# Patient Record
Sex: FEMALE | Race: BLACK OR AFRICAN AMERICAN | NOT HISPANIC OR LATINO | Employment: PART TIME | URBAN - METROPOLITAN AREA
[De-identification: names, ages, dates, MRNs, and addresses within clinical notes are randomized per-mention and may not be internally consistent; named-entity substitution may affect disease eponyms.]

---

## 2018-10-07 ENCOUNTER — HOSPITAL ENCOUNTER (EMERGENCY)
Facility: OTHER | Age: 38
Discharge: HOME | End: 2018-10-07
Attending: EMERGENCY MEDICINE
Payer: MEDICAID

## 2018-10-07 VITALS
TEMPERATURE: 99 F | HEART RATE: 89 BPM | SYSTOLIC BLOOD PRESSURE: 108 MMHG | RESPIRATION RATE: 18 BRPM | WEIGHT: 135 LBS | OXYGEN SATURATION: 100 % | BODY MASS INDEX: 26.5 KG/M2 | DIASTOLIC BLOOD PRESSURE: 68 MMHG | HEIGHT: 60 IN

## 2018-10-07 DIAGNOSIS — R55 SYNCOPE: ICD-10-CM

## 2018-10-07 DIAGNOSIS — Z3A.09 9 WEEKS GESTATION OF PREGNANCY: Primary | ICD-10-CM

## 2018-10-07 LAB
ABO + RH BLD: NORMAL
ANION GAP SERPL CALC-SCNC: 11 MMOL/L
B-HCG UR QL: POSITIVE
BASOPHILS # BLD AUTO: 0.02 K/UL
BASOPHILS NFR BLD: 0.2 %
BILIRUB UR QL STRIP: NEGATIVE
BUN SERPL-MCNC: 11 MG/DL
CALCIUM SERPL-MCNC: 9.6 MG/DL
CHLORIDE SERPL-SCNC: 105 MMOL/L
CLARITY UR: CLEAR
CO2 SERPL-SCNC: 22 MMOL/L
COLOR UR: YELLOW
CREAT SERPL-MCNC: 0.7 MG/DL
CTP QC/QA: YES
DIFFERENTIAL METHOD: ABNORMAL
EOSINOPHIL # BLD AUTO: 0.2 K/UL
EOSINOPHIL NFR BLD: 1.7 %
ERYTHROCYTE [DISTWIDTH] IN BLOOD BY AUTOMATED COUNT: 14.4 %
EST. GFR  (AFRICAN AMERICAN): >60 ML/MIN/1.73 M^2
EST. GFR  (NON AFRICAN AMERICAN): >60 ML/MIN/1.73 M^2
GLUCOSE SERPL-MCNC: 88 MG/DL
GLUCOSE UR QL STRIP: NEGATIVE
HCG INTACT+B SERPL-ACNC: NORMAL MIU/ML
HCT VFR BLD AUTO: 34.1 %
HGB BLD-MCNC: 11.1 G/DL
HGB UR QL STRIP: ABNORMAL
KETONES UR QL STRIP: ABNORMAL
LEUKOCYTE ESTERASE UR QL STRIP: NEGATIVE
LYMPHOCYTES # BLD AUTO: 2.4 K/UL
LYMPHOCYTES NFR BLD: 23.2 %
MCH RBC QN AUTO: 27.1 PG
MCHC RBC AUTO-ENTMCNC: 32.6 G/DL
MCV RBC AUTO: 83 FL
MICROSCOPIC COMMENT: NORMAL
MONOCYTES # BLD AUTO: 0.7 K/UL
MONOCYTES NFR BLD: 6.8 %
NEUTROPHILS # BLD AUTO: 7 K/UL
NEUTROPHILS NFR BLD: 67.9 %
NITRITE UR QL STRIP: NEGATIVE
PH UR STRIP: 6 [PH] (ref 5–8)
PLATELET # BLD AUTO: 277 K/UL
PMV BLD AUTO: 10.7 FL
POTASSIUM SERPL-SCNC: 3.6 MMOL/L
PROT UR QL STRIP: NEGATIVE
RBC # BLD AUTO: 4.1 M/UL
RBC #/AREA URNS HPF: 4 /HPF (ref 0–4)
RH BLD: NORMAL
SODIUM SERPL-SCNC: 138 MMOL/L
SP GR UR STRIP: >=1.03 (ref 1–1.03)
SQUAMOUS #/AREA URNS HPF: 2 /HPF
URN SPEC COLLECT METH UR: ABNORMAL
UROBILINOGEN UR STRIP-ACNC: 1 EU/DL
WBC # BLD AUTO: 10.3 K/UL

## 2018-10-07 PROCEDURE — 93010 ELECTROCARDIOGRAM REPORT: CPT | Mod: ,,, | Performed by: INTERNAL MEDICINE

## 2018-10-07 PROCEDURE — 85025 COMPLETE CBC W/AUTO DIFF WBC: CPT

## 2018-10-07 PROCEDURE — 86901 BLOOD TYPING SEROLOGIC RH(D): CPT

## 2018-10-07 PROCEDURE — 81025 URINE PREGNANCY TEST: CPT | Performed by: EMERGENCY MEDICINE

## 2018-10-07 PROCEDURE — 84702 CHORIONIC GONADOTROPIN TEST: CPT

## 2018-10-07 PROCEDURE — 80048 BASIC METABOLIC PNL TOTAL CA: CPT

## 2018-10-07 PROCEDURE — 99285 EMERGENCY DEPT VISIT HI MDM: CPT | Mod: 25

## 2018-10-07 PROCEDURE — 81000 URINALYSIS NONAUTO W/SCOPE: CPT

## 2018-10-07 PROCEDURE — 86901 BLOOD TYPING SEROLOGIC RH(D): CPT | Mod: 91

## 2018-10-07 PROCEDURE — 93005 ELECTROCARDIOGRAM TRACING: CPT

## 2018-10-07 NOTE — ED NOTES
Appearance: Pt awake, alert & oriented to person, place & time. Pt in no acute distress at present time. Pt is clean and well groomed with clothes appropriately fastened.   Skin: Skin warm, dry & intact. Color consistent with ethnicity. Mucous membranes moist. No breakdown or brusing noted.   Musculoskeletal: Patient moving all extremities well, no obvious swelling or deformities noted.   Respiratory: Respirations spontaneous, even, and non-labored. Visible chest rise noted. Airway is open and patent. No accessory muscle use noted.   Neurologic: Sensation is intact. Speech is clear and appropriate. Eyes open spontaneously, behavior appropriate to situation, follows commands, facial expression symmetrical, bilateral hand grasp equal and even, purposeful motor response noted.  Cardiac:No Bilateral lower extremity edema. Cap refill is <3 seconds. Pt denies active chest pains, SOB, dizziness, blurred vision, weakness or fatigue at this time.   Abdomen: Pt denies active abd pains, cramping or discomfort, No N/V/D at this time.

## 2018-10-07 NOTE — ED NOTES
"Pt to ED, reporting episodes of dizziness, weakness, with + LOC while at home yesterday. Pt states "I passed out but I think only for 10 minutes." Pt denies vomiting, nausea, or ABD pain. Pt AAOx4 and appropriate at this time. Respirations even and unlabored. No acute distress noted.    "

## 2018-10-07 NOTE — ED PROVIDER NOTES
"Encounter Date: 10/7/2018    SCRIBE #1 NOTE: I, Adilenelevi Major, am scribing for, and in the presence of, Dr. Cruz.       History     Chief Complaint   Patient presents with    Loss of Consciousness     Pt reports episodes of "passing out". Reports been going on for 1.5 yrs. Last episode yesterday     Time seen by provider: 2:38 PM    This is a 37 y.o. Female, , who presents with complaint of syncope yesterday. She reports that she has been having episodes approximately once a week for the last year. She was prompted to be seen at the ED today due to being "tired of the feeling of passing out". She reports that her most recent episode occurred while she was washing dishes. She was able to fall onto the sofa, and she experienced blurry vision, but no complete LOC. However, she reports that some of her episodes do involve complete LOC. She denies fever, chills, rhinorrhea, photophobia, SOB, CP, abdominal pain, N/V/D, any urinary symptoms, back pain, headaches, or weakness. She has no PCP. She reports that she has been under a lot of stress lately. She denies history of heart problems. The pt states that when she last went to the pharmacy to retrieve her birth control a month ago, she noticed that the birth control had been changed.        The history is provided by the patient.     Review of patient's allergies indicates:  No Known Allergies  History reviewed. No pertinent past medical history.  Past Surgical History:   Procedure Laterality Date     SECTION       History reviewed. No pertinent family history.  Social History     Tobacco Use    Smoking status: Never Smoker    Smokeless tobacco: Never Used   Substance Use Topics    Alcohol use: No     Frequency: Never    Drug use: Yes     Types: Marijuana     Review of Systems   Constitutional: Negative for chills and fever.   HENT: Negative for rhinorrhea.    Eyes: Positive for visual disturbance. Negative for photophobia.   Respiratory: Negative " for shortness of breath.    Cardiovascular: Negative for chest pain.   Gastrointestinal: Negative for abdominal pain, diarrhea, nausea and vomiting.   Genitourinary: Negative for decreased urine volume, difficulty urinating, dyspareunia, dysuria, enuresis, frequency, hematuria and urgency.   Musculoskeletal: Negative for back pain.   Skin: Negative for color change, pallor and rash.   Neurological: Positive for syncope. Negative for weakness and headaches.   Hematological: Negative for adenopathy. Does not bruise/bleed easily.       Physical Exam     Initial Vitals [10/07/18 1434]   BP Pulse Resp Temp SpO2   115/66 75 18 98.4 °F (36.9 °C) 99 %      MAP       --         Physical Exam    Nursing note and vitals reviewed.  Constitutional: She appears well-developed and well-nourished. She is not diaphoretic. No distress.   Tearful when told she was pregnant.   HENT:   Head: Normocephalic and atraumatic.   Eyes: Conjunctivae and EOM are normal. No scleral icterus.   Neck: Normal range of motion. Neck supple.   Cardiovascular: Normal rate, regular rhythm and normal heart sounds. Exam reveals no gallop and no friction rub.    No murmur heard.  Pulmonary/Chest: Breath sounds normal. No respiratory distress. She has no wheezes. She has no rhonchi. She has no rales.   Abdominal: Soft. Bowel sounds are normal. She exhibits no distension. There is no tenderness. There is no rebound and no guarding.   Musculoskeletal: Normal range of motion. She exhibits no edema or tenderness.   Neurological: She is alert and oriented to person, place, and time.   Skin: Skin is warm and dry. No rash noted. No erythema. No pallor.   Psychiatric: She has a normal mood and affect. Her behavior is normal. Judgment and thought content normal.         ED Course   Procedures  Labs Reviewed   CBC W/ AUTO DIFFERENTIAL - Abnormal; Notable for the following components:       Result Value    Hemoglobin 11.1 (*)     Hematocrit 34.1 (*)     All other  components within normal limits   BASIC METABOLIC PANEL - Abnormal; Notable for the following components:    CO2 22 (*)     All other components within normal limits   URINALYSIS, REFLEX TO URINE CULTURE - Abnormal; Notable for the following components:    Specific Gravity, UA >=1.030 (*)     Ketones, UA Trace (*)     Occult Blood UA 2+ (*)     All other components within normal limits    Narrative:     Preferred Collection Type->Urine, Clean Catch   POCT URINE PREGNANCY - Abnormal; Notable for the following components:    POC Preg Test, Ur Positive (*)     All other components within normal limits   HCG, QUANTITATIVE, PREGNANCY   URINALYSIS MICROSCOPIC    Narrative:     Preferred Collection Type->Urine, Clean Catch   GROUP & RH   RH TYPING     EKG Readings: (Independently Interpreted)   Rhythm: Normal Sinus Rhythm. Heart Rate: 66. Ectopy: No Ectopy. Conduction: Normal. ST Segments: Normal ST Segments. T Waves: Normal. Clinical Impression: Normal Sinus Rhythm       Imaging Results          US OB Less Than 14 Wks with Transvaginal (xpd) (Final result)  Result time 10/07/18 17:16:40   Procedure changed from US OB Less Than 14 Wks Add Gestation     Final result by Bart Berg MD (10/07/18 17:16:40)                 Impression:      Single live intrauterine gestation corresponding to 8 weeks and 6 days with expected date of delivery of 05/13/2019.  Small associated subchorionic hemorrhage.  Continued follow-up is suggested.      Electronically signed by: Bart Berg MD  Date:    10/07/2018  Time:    17:16             Narrative:    EXAMINATION:  US OB LESS THAN 14 WKS WITH TRANSVAGINAL (XPD)    CLINICAL HISTORY:  syncope; Syncope and collapse    TECHNIQUE:  Transabdominal sonography of the pelvis was performed, followed by transvaginal sonography to better evaluate the uterus and ovaries.    COMPARISON:  None.    FINDINGS:  The uterus measures 9.8 x 6.2 x 6.4 cm.  There is a single live intrauterine gestation with  gestational sac diameter measuring 3.46 cm.  The crown-rump length measures 19.16 mm.  There is a yolk sac present.  There is a fetal pole with fetal heart rate of 172 beats per minute.  The average ultrasound age is 8 weeks and 6 days.  The expected date of delivery is 05/13/2019.  There is a small subchorionic hemorrhage.  There is a subcentimeter uterine fibroid measuring 0.8 x 0.8 x 0.8 cm.  There is trace amount of fluid within the endocervical canal.  The cervix is closed.    The right ovary measures 2.3 x 1.8 x 1.2 cm.  The left ovary measures 3.3 x 3.3 x 2.1 cm.  There is a left-sided corpus luteum cyst.  There is normal flow to both ovaries.    There is no evidence of free fluid.                                 Medical Decision Making:   Clinical Tests:   Lab Tests: Ordered and Reviewed  Radiological Study: Ordered and Reviewed  Medical Tests: Ordered and Reviewed  ED Management:  37-year-old female with near syncope.  States this has been occurring intermittently approximately 1 time per week for the last year.  She has no factors that she can note that preceded this.  She states she has been going through a bit of stress recently.  EKG is normal.  Pregnancy test is positive. The patient was unaware of this.  Last menstrual.  Was approximately September 12th about 4 weeks ago.  Will go ahead and get a pregnancy workup to rule out ectopic pregnancy as the cause of her syncopal episode yesterday.    The the blood work shows no acute abnormality.  HCG quantitative is 115,000.  Ultrasound shows 9 weeks intrauterine fetus.  Patient's blood type is A negative. She denies any vaginal bleeding.  RhoGAM is not indicated.  I discussed the results with the patient.  She recommend she follow up with her OB doctor for prenatal care tomorrow.    Patient discharged home in stable condition. Diagnosis and treatment plan explained to patient. I have answered all questions and the patient is satisfied with the plan of care.  The patient demonstrates understanding of the care plan. This is the extent to the patients complaints today here in the emergency department.            Scribe Attestation:   Scribe #1: I performed the above scribed service and the documentation accurately describes the services I performed. I attest to the accuracy of the note.    Attending Attestation:           Physician Attestation for Scribe:  Physician Attestation Statement for Scribe #1: I, Dr. Cruz, reviewed documentation, as scribed by Chance Major in my presence, and it is both accurate and complete.                    Clinical Impression:     1. 9 weeks gestation of pregnancy    2. Syncope                                   Lex Cruz, DO  10/07/18 1802

## 2019-02-28 PROBLEM — R07.9 CHEST PAIN: Status: ACTIVE | Noted: 2019-02-28

## 2019-02-28 PROBLEM — G93.41 ACUTE METABOLIC ENCEPHALOPATHY: Status: ACTIVE | Noted: 2019-02-28

## 2019-02-28 PROBLEM — J96.02 ACUTE RESPIRATORY FAILURE WITH HYPOXIA AND HYPERCAPNIA: Status: ACTIVE | Noted: 2019-02-28

## 2019-02-28 PROBLEM — T40.601A OPIATE OVERDOSE: Status: ACTIVE | Noted: 2019-02-28

## 2019-02-28 PROBLEM — J96.01 ACUTE RESPIRATORY FAILURE WITH HYPOXIA AND HYPERCAPNIA: Status: ACTIVE | Noted: 2019-02-28

## 2022-05-20 ENCOUNTER — OFFICE VISIT (OUTPATIENT)
Dept: PRIMARY CARE CLINIC | Facility: CLINIC | Age: 42
End: 2022-05-20
Payer: MEDICAID

## 2022-05-20 VITALS
HEIGHT: 60 IN | OXYGEN SATURATION: 97 % | DIASTOLIC BLOOD PRESSURE: 82 MMHG | WEIGHT: 145.63 LBS | SYSTOLIC BLOOD PRESSURE: 121 MMHG | HEART RATE: 73 BPM | TEMPERATURE: 98 F | BODY MASS INDEX: 28.59 KG/M2

## 2022-05-20 DIAGNOSIS — F41.1 GENERALIZED ANXIETY DISORDER: ICD-10-CM

## 2022-05-20 DIAGNOSIS — F33.1 MODERATE EPISODE OF RECURRENT MAJOR DEPRESSIVE DISORDER: Primary | ICD-10-CM

## 2022-05-20 PROBLEM — G93.41 ACUTE METABOLIC ENCEPHALOPATHY: Status: RESOLVED | Noted: 2019-02-28 | Resolved: 2022-05-20

## 2022-05-20 PROBLEM — J96.01 ACUTE RESPIRATORY FAILURE WITH HYPOXIA AND HYPERCAPNIA: Status: RESOLVED | Noted: 2019-02-28 | Resolved: 2022-05-20

## 2022-05-20 PROBLEM — J96.02 ACUTE RESPIRATORY FAILURE WITH HYPOXIA AND HYPERCAPNIA: Status: RESOLVED | Noted: 2019-02-28 | Resolved: 2022-05-20

## 2022-05-20 PROBLEM — R07.9 CHEST PAIN: Status: RESOLVED | Noted: 2019-02-28 | Resolved: 2022-05-20

## 2022-05-20 PROBLEM — T40.601A OPIATE OVERDOSE: Status: RESOLVED | Noted: 2019-02-28 | Resolved: 2022-05-20

## 2022-05-20 PROCEDURE — 99214 OFFICE O/P EST MOD 30 MIN: CPT | Mod: S$PBB,,, | Performed by: FAMILY MEDICINE

## 2022-05-20 PROCEDURE — 3008F BODY MASS INDEX DOCD: CPT | Mod: CPTII,,, | Performed by: FAMILY MEDICINE

## 2022-05-20 PROCEDURE — 99214 OFFICE O/P EST MOD 30 MIN: CPT | Mod: PBBFAC,PN | Performed by: FAMILY MEDICINE

## 2022-05-20 PROCEDURE — 99999 PR PBB SHADOW E&M-EST. PATIENT-LVL IV: ICD-10-PCS | Mod: PBBFAC,,, | Performed by: FAMILY MEDICINE

## 2022-05-20 PROCEDURE — 1159F PR MEDICATION LIST DOCUMENTED IN MEDICAL RECORD: ICD-10-PCS | Mod: CPTII,,, | Performed by: FAMILY MEDICINE

## 2022-05-20 PROCEDURE — 1159F MED LIST DOCD IN RCRD: CPT | Mod: CPTII,,, | Performed by: FAMILY MEDICINE

## 2022-05-20 PROCEDURE — 3074F PR MOST RECENT SYSTOLIC BLOOD PRESSURE < 130 MM HG: ICD-10-PCS | Mod: CPTII,,, | Performed by: FAMILY MEDICINE

## 2022-05-20 PROCEDURE — 3074F SYST BP LT 130 MM HG: CPT | Mod: CPTII,,, | Performed by: FAMILY MEDICINE

## 2022-05-20 PROCEDURE — 3079F PR MOST RECENT DIASTOLIC BLOOD PRESSURE 80-89 MM HG: ICD-10-PCS | Mod: CPTII,,, | Performed by: FAMILY MEDICINE

## 2022-05-20 PROCEDURE — 99999 PR PBB SHADOW E&M-EST. PATIENT-LVL IV: CPT | Mod: PBBFAC,,, | Performed by: FAMILY MEDICINE

## 2022-05-20 PROCEDURE — 99214 PR OFFICE/OUTPT VISIT, EST, LEVL IV, 30-39 MIN: ICD-10-PCS | Mod: S$PBB,,, | Performed by: FAMILY MEDICINE

## 2022-05-20 PROCEDURE — 3079F DIAST BP 80-89 MM HG: CPT | Mod: CPTII,,, | Performed by: FAMILY MEDICINE

## 2022-05-20 PROCEDURE — 3008F PR BODY MASS INDEX (BMI) DOCUMENTED: ICD-10-PCS | Mod: CPTII,,, | Performed by: FAMILY MEDICINE

## 2022-05-20 RX ORDER — FLUOXETINE 10 MG/1
10 CAPSULE ORAL DAILY
Qty: 30 CAPSULE | Refills: 1 | Status: SHIPPED | OUTPATIENT
Start: 2022-05-20 | End: 2023-05-20

## 2022-05-20 NOTE — PROGRESS NOTES
Subjective:       Patient ID: Jolie Erwin is a 41 y.o. female.    Chief Complaint: Establish Care (ER discharge f/u)    40 yo F pt, new to me, with PMH significant for childhood asthma. She presents to [facility]; acutely here for ED discharge follow-up visit from 2022.  She presented with complaint of SOB one day prior to admission.  Symptoms began while she was cooking.  Developed associated jitteriness, hot flashes, heart palpitations, and chest tightness.  No cough or wheezing.  Reported anxious feelings at times.  She had an x-ray which was negative.  Symptoms and deemed to be secondary to anxiety/panic.  She was discharged with clonazepam 0.5 mg TID and advised to follow-up with a PCP.     Since discharge she has been taking 1/2 tab of clonazepam 0.5 mg BID. This is somewhat helping with feelings of anxiety, however, she continues to feel down and depressed. Reports she was rx'd lexapro in  for depression, however, she stopped taking the medication because they made her feel like a zombie.  She was also prescribed Xanax in the past.  Has never done any psychotherapy.  Patient's abnormal mood stems from difficulties dealing with her son.  Patient states that her son has been depressed; she is frustrated and helpless as she can't help son. No SI/HI/AVH.      Past Medical History:   Diagnosis Date    Childhood asthma        Patient Active Problem List   Diagnosis    Fracture of left ulna    Moderate episode of recurrent major depressive disorder    Generalized anxiety disorder       Past Surgical History:   Procedure Laterality Date     SECTION          ENDOMETRIAL ABLATION  2017       Family History   Problem Relation Age of Onset    Stroke Maternal Grandmother     Hypertension Maternal Cousin     Hypertension Maternal Cousin        Social History     Tobacco Use   Smoking Status Never Smoker   Smokeless Tobacco Never Used       Social History     Social History Narrative     Tobacco: Intermittent use since 20s; max use 1 pack per week. Max time of no use 2-3 years.     EtOH: 1-2 drinks social gatherings 9-10x per year    Drug: recreational use; marijuana at bedtime 3-4x per week    Employment: Unemployed    Education: HS     Lives with 2 children (18 yo and 21 yo) and 2yo grandson     2017       Medications have been reviewed and reconciled.     Review of patient's allergies indicates:  No Known Allergies     Review of Systems   Psychiatric/Behavioral: Positive for dysphoric mood. The patient is nervous/anxious.          Objective:        /82 (BP Location: Right arm, Patient Position: Sitting, BP Method: Medium (Automatic))   Pulse 73   Temp 98.4 °F (36.9 °C) (Oral)   Ht 5' (1.524 m)   Wt 66 kg (145 lb 9.8 oz)   SpO2 97%   BMI 28.44 kg/m²      Physical Exam  Constitutional:       General: She is not in acute distress.     Appearance: She is well-developed.   HENT:      Head: Normocephalic and atraumatic.      Right Ear: External ear normal.      Left Ear: External ear normal.   Eyes:      General: No scleral icterus.     Extraocular Movements: Extraocular movements intact.      Conjunctiva/sclera: Conjunctivae normal.   Cardiovascular:      Rate and Rhythm: Normal rate and regular rhythm.      Heart sounds: Normal heart sounds. No murmur heard.    No friction rub. No gallop.   Pulmonary:      Effort: Pulmonary effort is normal.      Breath sounds: Normal breath sounds. No wheezing or rales.   Musculoskeletal:         General: Normal range of motion.      Cervical back: Normal range of motion and neck supple.   Skin:     General: Skin is warm and dry.      Findings: No erythema or rash.   Neurological:      Mental Status: She is alert and oriented to person, place, and time.      Cranial Nerves: No cranial nerve deficit.   Psychiatric:         Mood and Affect: Mood is depressed. Affect is tearful.         Behavior: Behavior normal.         Assessment:       1.  Moderate episode of recurrent major depressive disorder    2. Generalized anxiety disorder        Plan:       Jolie was seen today for establish care.    Diagnoses and all orders for this visit:    Moderate episode of recurrent major depressive disorder  -     Ambulatory referral/consult to Primary Care Behavioral Health (Non-Opioids); Future  -     FLUoxetine 10 MG capsule; Take 1 capsule (10 mg total) by mouth once daily.    Generalized anxiety disorder      Moderate MDD, Moderate MAK   PHQ 9 score is 15; MAK-7 score is 14 in office today 05/20/2022  Continue clonazepam 0.25 mg twice daily p.r.n..  Start fluoxetine 10 mg daily.  Referred to primary care behavioral health program  Will see patient in approximately 3 weeks for medication titration as tolerated.  Pt's son is welcome to see me via virtual visit.       Follow up in about 3 weeks (around 6/10/2022) for mood (in office or virtual). Can be same day slot.  Will need bedrest preventive health measures over longitudinal visits.    I spent a total of 30 minutes on the day of the visit.This includes face to face time and non-face to face time preparing to see the patient (eg, review of tests), obtaining and/or reviewing separately obtained history, documenting clinical information in the electronic or other health record, independently interpreting results and communicating results to the patient/family/caregiver, or care coordinator.

## 2022-05-23 ENCOUNTER — TELEPHONE (OUTPATIENT)
Dept: BEHAVIORAL HEALTH | Facility: CLINIC | Age: 42
End: 2022-05-23
Payer: MEDICAID

## 2022-05-23 NOTE — PROGRESS NOTES
CHW made an attempt to contact patient regarding the referral.  CHW was unable to leave a message.

## 2022-05-24 ENCOUNTER — TELEPHONE (OUTPATIENT)
Dept: BEHAVIORAL HEALTH | Facility: CLINIC | Age: 42
End: 2022-05-24
Payer: MEDICAID

## 2022-05-24 NOTE — PROGRESS NOTES
CHW made a second attempt to contact patient concerning the referral.  CHW was unable to leave a voice mail message.

## 2022-05-27 ENCOUNTER — TELEPHONE (OUTPATIENT)
Dept: BEHAVIORAL HEALTH | Facility: CLINIC | Age: 42
End: 2022-05-27
Payer: MEDICAID

## 2022-05-27 NOTE — PROGRESS NOTES
CHW contacted patient regarding the referral.  CHW left a message asking patient to return her telephone call.

## 2022-06-01 DIAGNOSIS — Z12.31 OTHER SCREENING MAMMOGRAM: ICD-10-CM

## 2022-06-07 ENCOUNTER — PATIENT MESSAGE (OUTPATIENT)
Dept: PRIMARY CARE CLINIC | Facility: CLINIC | Age: 42
End: 2022-06-07
Payer: MEDICAID

## 2022-06-13 ENCOUNTER — TELEPHONE (OUTPATIENT)
Dept: BEHAVIORAL HEALTH | Facility: CLINIC | Age: 42
End: 2022-06-13
Payer: MEDICAID

## 2022-06-13 NOTE — PROGRESS NOTES
CHW made a third attempt to contact patient regarding the referral.  CHW was unable to leave a voicemail message.

## 2022-06-15 ENCOUNTER — TELEPHONE (OUTPATIENT)
Dept: BEHAVIORAL HEALTH | Facility: CLINIC | Age: 42
End: 2022-06-15
Payer: MEDICAID

## 2022-06-15 ENCOUNTER — PATIENT MESSAGE (OUTPATIENT)
Dept: BEHAVIORAL HEALTH | Facility: CLINIC | Age: 42
End: 2022-06-15
Payer: MEDICAID

## 2022-06-15 NOTE — PROGRESS NOTES
CHW made a fourth attempt to contact patient regarding the referral.  Patient did not answer her telephone.  CHW will send patient a email message to inform her of the referral.

## 2022-07-13 DIAGNOSIS — Z11.59 NEED FOR HEPATITIS C SCREENING TEST: ICD-10-CM

## 2022-07-21 ENCOUNTER — PATIENT OUTREACH (OUTPATIENT)
Dept: ADMINISTRATIVE | Facility: OTHER | Age: 42
End: 2022-07-21
Payer: MEDICAID

## 2022-07-21 ENCOUNTER — OFFICE VISIT (OUTPATIENT)
Dept: PRIMARY CARE CLINIC | Facility: CLINIC | Age: 42
End: 2022-07-21
Payer: MEDICAID

## 2022-07-21 VITALS
WEIGHT: 144.31 LBS | OXYGEN SATURATION: 97 % | HEART RATE: 64 BPM | BODY MASS INDEX: 28.33 KG/M2 | HEIGHT: 60 IN | DIASTOLIC BLOOD PRESSURE: 80 MMHG | SYSTOLIC BLOOD PRESSURE: 114 MMHG

## 2022-07-21 DIAGNOSIS — F33.1 MODERATE EPISODE OF RECURRENT MAJOR DEPRESSIVE DISORDER: Primary | ICD-10-CM

## 2022-07-21 DIAGNOSIS — F41.1 GENERALIZED ANXIETY DISORDER: ICD-10-CM

## 2022-07-21 PROCEDURE — 99999 PR PBB SHADOW E&M-EST. PATIENT-LVL III: ICD-10-PCS | Mod: PBBFAC,,, | Performed by: FAMILY MEDICINE

## 2022-07-21 PROCEDURE — 3008F PR BODY MASS INDEX (BMI) DOCUMENTED: ICD-10-PCS | Mod: CPTII,,, | Performed by: FAMILY MEDICINE

## 2022-07-21 PROCEDURE — 1159F MED LIST DOCD IN RCRD: CPT | Mod: CPTII,,, | Performed by: FAMILY MEDICINE

## 2022-07-21 PROCEDURE — 99213 OFFICE O/P EST LOW 20 MIN: CPT | Mod: PBBFAC,PN | Performed by: FAMILY MEDICINE

## 2022-07-21 PROCEDURE — 3074F SYST BP LT 130 MM HG: CPT | Mod: CPTII,,, | Performed by: FAMILY MEDICINE

## 2022-07-21 PROCEDURE — 3079F PR MOST RECENT DIASTOLIC BLOOD PRESSURE 80-89 MM HG: ICD-10-PCS | Mod: CPTII,,, | Performed by: FAMILY MEDICINE

## 2022-07-21 PROCEDURE — 3074F PR MOST RECENT SYSTOLIC BLOOD PRESSURE < 130 MM HG: ICD-10-PCS | Mod: CPTII,,, | Performed by: FAMILY MEDICINE

## 2022-07-21 PROCEDURE — 99213 PR OFFICE/OUTPT VISIT, EST, LEVL III, 20-29 MIN: ICD-10-PCS | Mod: S$PBB,,, | Performed by: FAMILY MEDICINE

## 2022-07-21 PROCEDURE — 99999 PR PBB SHADOW E&M-EST. PATIENT-LVL III: CPT | Mod: PBBFAC,,, | Performed by: FAMILY MEDICINE

## 2022-07-21 PROCEDURE — 3079F DIAST BP 80-89 MM HG: CPT | Mod: CPTII,,, | Performed by: FAMILY MEDICINE

## 2022-07-21 PROCEDURE — 3008F BODY MASS INDEX DOCD: CPT | Mod: CPTII,,, | Performed by: FAMILY MEDICINE

## 2022-07-21 PROCEDURE — 1159F PR MEDICATION LIST DOCUMENTED IN MEDICAL RECORD: ICD-10-PCS | Mod: CPTII,,, | Performed by: FAMILY MEDICINE

## 2022-07-21 PROCEDURE — 99213 OFFICE O/P EST LOW 20 MIN: CPT | Mod: S$PBB,,, | Performed by: FAMILY MEDICINE

## 2022-07-21 NOTE — PROGRESS NOTES
CHW - Initial Contact    This Community Health Worker completed the Social Determinant of Health questionnaire with [id number] in person on today.    Pt identified barriers of most importance are: Please see SDOH icons   Referrals to community agencies completed with patient/caregiver consent outside of [facility] include: NO outside referrals at this time.   Referrals were put through [facility] - No referrals   Support and Services: N/A  Other information discussed the patient needs / wants help with: The pt shared that she does not need any resources at this time.   Follow up required: Yes   Follow-up Outreach - Due: 7/27/2022

## 2022-07-21 NOTE — PROGRESS NOTES
"Subjective:       Patient ID: Jolie Erwin is a 41 y.o. female.    Chief Complaint: Follow-up    40 yo F, established pt of mine, with PMH significant for childhood asthma and newly dx'd MDD/MAK. She presents for a f/u visit. She was last evaluated 05/20/2022.  Details of previous visit(s) reviewed:   ======  She presented to ED on 05/06/2022 with complaint of SOB one day prior to admission.  Symptoms began while she was cooking.  Developed associated jitteriness, hot flashes, heart palpitations, and chest tightness.  No cough or wheezing.  Reported anxious feelings at times.  She had an x-ray which was negative.  Symptoms and deemed to be secondary to anxiety/panic.  She was discharged with clonazepam 0.5 mg TID and advised to follow-up with a PCP.     Post discharge she has been taking 1/2 tab of clonazepam 0.5 mg BID. This somewhat helps with feelings of anxiety, however, she continues to feel down and depressed. Reports she was rx'd lexapro in 2005 for depression, however, she stopped taking the medication because they made her "feel like a zombie".  She was also prescribed Xanax in the past.  Has never done any psychotherapy.  Patient's abnormal mood stems from difficulties dealing with her son.  Patient states that her son has been depressed; she is frustrated and helpless as she can't help son. No SI/HI/AVH.  ==========  Last visit, she was started on clonazepam 0.25 mg twice daily p.r.n. +  fluoxetine 10 mg daily. Reports she was becoming too sleepy with clonazepam. On average was taking fluoxetine 10 mg every other day; medication caused insomnia. She never established care with primary care behavioral health. She did not establish with an outside therapist. 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  She has been generally doing well with control of her emotions via deep breathing. She would like to hold off on restarting " medication; she would like to give therapy a try.      Past Medical History:   Diagnosis Date    Childhood asthma        Patient Active Problem List   Diagnosis    Fracture of left ulna    Moderate episode of recurrent major depressive disorder    Generalized anxiety disorder       Past Surgical History:   Procedure Laterality Date     SECTION          ENDOMETRIAL ABLATION  2017       Family History   Problem Relation Age of Onset    Stroke Maternal Grandmother     Hypertension Maternal Cousin     Hypertension Maternal Cousin        Social History     Tobacco Use   Smoking Status Never Smoker   Smokeless Tobacco Never Used       Social History     Social History Narrative    Tobacco: Intermittent use since 20s; max use 1 pack per week. Max time of no use 2-3 years.     EtOH: 1-2 drinks social gatherings 9-10x per year    Drug: recreational use; marijuana at bedtime 3-4x per week    Employment: Unemployed    Education: HS     Lives with 2 children (20 yo and 23 yo) and 2yo grandson     2017       Medications have been reviewed and reconciled.     Review of patient's allergies indicates:  No Known Allergies     Review of Systems   Psychiatric/Behavioral: Negative for dysphoric mood. The patient is not nervous/anxious.          Objective:        /80 (BP Location: Left arm, Patient Position: Sitting, BP Method: Large (Manual))   Pulse 64   Ht 5' (1.524 m)   Wt 65.5 kg (144 lb 4.7 oz)   LMP  (LMP Unknown)   SpO2 97%   BMI 28.18 kg/m²      Physical Exam  Constitutional:       General: She is not in acute distress.     Appearance: She is well-developed.   HENT:      Head: Normocephalic and atraumatic.      Right Ear: External ear normal.      Left Ear: External ear normal.   Eyes:      General: No scleral icterus.     Extraocular Movements: Extraocular movements intact.      Conjunctiva/sclera: Conjunctivae normal.   Cardiovascular:      Rate and Rhythm: Normal rate and regular  rhythm.      Heart sounds: Normal heart sounds. No murmur heard.    No friction rub. No gallop.   Pulmonary:      Effort: Pulmonary effort is normal.      Breath sounds: Normal breath sounds. No wheezing or rales.   Musculoskeletal:         General: Normal range of motion.      Cervical back: Normal range of motion and neck supple.   Skin:     General: Skin is warm and dry.      Findings: No erythema or rash.   Neurological:      Mental Status: She is alert and oriented to person, place, and time.      Cranial Nerves: No cranial nerve deficit.   Psychiatric:         Mood and Affect: Mood is not anxious or depressed.         Behavior: Behavior normal.         Assessment:       1. Moderate episode of recurrent major depressive disorder    2. Generalized anxiety disorder        Plan:       Jolie was seen today for follow-up.    Diagnoses and all orders for this visit:    Moderate episode of recurrent major depressive disorder    Generalized anxiety disorder      Moderate MDD, Moderate MAK   PHQ 9 score 15; MAK-7 score 14 in office 05/20/2022  Holding on pharmacotherapy  Will get patient scheduled with primary care behavioral health program    Follow up in about 12 weeks (around 10/13/2022) for f/u for annual exam and pap. .     I spent a total of 20 minutes on the day of the visit.This includes face to face time and non-face to face time preparing to see the patient (eg, review of tests), obtaining and/or reviewing separately obtained history, documenting clinical information in the

## 2022-08-01 ENCOUNTER — PATIENT OUTREACH (OUTPATIENT)
Dept: ADMINISTRATIVE | Facility: OTHER | Age: 42
End: 2022-08-01
Payer: MEDICAID

## 2022-08-01 NOTE — PROGRESS NOTES
CHW - Case Closure    This Community Health Worker spoke to [id number] via telephone on today.   Pt/Caregiver reported: The pt reported that she does not need any resources at this time.  Pt/Caregiver denied any additional needs at this time and agrees with episode closure at this time.  Provided [phone] with Community Health Worker's contact information and encouraged him/her to contact this Community Health Worker if additional needs arise.

## 2022-08-23 ENCOUNTER — PATIENT MESSAGE (OUTPATIENT)
Dept: PRIMARY CARE CLINIC | Facility: CLINIC | Age: 42
End: 2022-08-23
Payer: MEDICAID

## 2022-08-24 ENCOUNTER — HOSPITAL ENCOUNTER (OUTPATIENT)
Dept: RADIOLOGY | Facility: OTHER | Age: 42
Discharge: HOME | End: 2022-08-24
Attending: FAMILY MEDICINE
Payer: MEDICAID

## 2022-08-24 DIAGNOSIS — Z12.31 OTHER SCREENING MAMMOGRAM: ICD-10-CM

## 2022-08-24 PROCEDURE — 77067 SCR MAMMO BI INCL CAD: CPT | Mod: TC

## 2022-08-24 PROCEDURE — 77067 MAMMO DIGITAL SCREENING BILAT WITH TOMO: ICD-10-PCS | Mod: 26,,, | Performed by: RADIOLOGY

## 2022-08-24 PROCEDURE — 77063 BREAST TOMOSYNTHESIS BI: CPT | Mod: TC

## 2022-08-24 PROCEDURE — 77067 SCR MAMMO BI INCL CAD: CPT | Mod: 26,,, | Performed by: RADIOLOGY

## 2022-08-24 PROCEDURE — 77063 MAMMO DIGITAL SCREENING BILAT WITH TOMO: ICD-10-PCS | Mod: 26,,, | Performed by: RADIOLOGY

## 2022-08-24 PROCEDURE — 77063 BREAST TOMOSYNTHESIS BI: CPT | Mod: 26,,, | Performed by: RADIOLOGY

## 2022-09-06 ENCOUNTER — PATIENT MESSAGE (OUTPATIENT)
Dept: PRIMARY CARE CLINIC | Facility: CLINIC | Age: 42
End: 2022-09-06

## 2022-09-06 DIAGNOSIS — R06.02 SOB (SHORTNESS OF BREATH): ICD-10-CM

## 2022-09-07 RX ORDER — ALBUTEROL SULFATE 90 UG/1
2 AEROSOL, METERED RESPIRATORY (INHALATION) EVERY 6 HOURS PRN
Qty: 18 G | Refills: 0 | Status: SHIPPED | OUTPATIENT
Start: 2022-09-07 | End: 2022-11-06 | Stop reason: SDUPTHER

## 2022-10-13 ENCOUNTER — OFFICE VISIT (OUTPATIENT)
Dept: PRIMARY CARE CLINIC | Facility: CLINIC | Age: 42
End: 2022-10-13
Payer: MEDICAID

## 2022-10-13 VITALS
OXYGEN SATURATION: 98 % | DIASTOLIC BLOOD PRESSURE: 72 MMHG | SYSTOLIC BLOOD PRESSURE: 113 MMHG | HEIGHT: 60 IN | HEART RATE: 64 BPM | WEIGHT: 141.19 LBS | BODY MASS INDEX: 27.72 KG/M2

## 2022-10-13 DIAGNOSIS — E66.3 OVERWEIGHT (BMI 25.0-29.9): ICD-10-CM

## 2022-10-13 DIAGNOSIS — Z01.419 WELL WOMAN EXAM: Primary | ICD-10-CM

## 2022-10-13 DIAGNOSIS — Z12.4 SCREENING FOR CERVICAL CANCER: ICD-10-CM

## 2022-10-13 DIAGNOSIS — F41.1 GENERALIZED ANXIETY DISORDER: ICD-10-CM

## 2022-10-13 DIAGNOSIS — F33.1 MODERATE EPISODE OF RECURRENT MAJOR DEPRESSIVE DISORDER: ICD-10-CM

## 2022-10-13 PROCEDURE — 87624 HPV HI-RISK TYP POOLED RSLT: CPT | Performed by: FAMILY MEDICINE

## 2022-10-13 PROCEDURE — 3074F PR MOST RECENT SYSTOLIC BLOOD PRESSURE < 130 MM HG: ICD-10-PCS | Mod: CPTII,,, | Performed by: FAMILY MEDICINE

## 2022-10-13 PROCEDURE — 88175 CYTOPATH C/V AUTO FLUID REDO: CPT | Performed by: FAMILY MEDICINE

## 2022-10-13 PROCEDURE — 99396 PR PREVENTIVE VISIT,EST,40-64: ICD-10-PCS | Mod: S$PBB,,, | Performed by: FAMILY MEDICINE

## 2022-10-13 PROCEDURE — 99213 OFFICE O/P EST LOW 20 MIN: CPT | Mod: PBBFAC,PN | Performed by: FAMILY MEDICINE

## 2022-10-13 PROCEDURE — 3078F PR MOST RECENT DIASTOLIC BLOOD PRESSURE < 80 MM HG: ICD-10-PCS | Mod: CPTII,,, | Performed by: FAMILY MEDICINE

## 2022-10-13 PROCEDURE — 3044F PR MOST RECENT HEMOGLOBIN A1C LEVEL <7.0%: ICD-10-PCS | Mod: CPTII,,, | Performed by: FAMILY MEDICINE

## 2022-10-13 PROCEDURE — 99999 PR PBB SHADOW E&M-EST. PATIENT-LVL III: ICD-10-PCS | Mod: PBBFAC,,, | Performed by: FAMILY MEDICINE

## 2022-10-13 PROCEDURE — 1159F MED LIST DOCD IN RCRD: CPT | Mod: CPTII,,, | Performed by: FAMILY MEDICINE

## 2022-10-13 PROCEDURE — 1159F PR MEDICATION LIST DOCUMENTED IN MEDICAL RECORD: ICD-10-PCS | Mod: CPTII,,, | Performed by: FAMILY MEDICINE

## 2022-10-13 PROCEDURE — 3078F DIAST BP <80 MM HG: CPT | Mod: CPTII,,, | Performed by: FAMILY MEDICINE

## 2022-10-13 PROCEDURE — 90715 TDAP VACCINE 7 YRS/> IM: CPT | Mod: PBBFAC,PN

## 2022-10-13 PROCEDURE — 99999 PR PBB SHADOW E&M-EST. PATIENT-LVL III: CPT | Mod: PBBFAC,,, | Performed by: FAMILY MEDICINE

## 2022-10-13 PROCEDURE — 3044F HG A1C LEVEL LT 7.0%: CPT | Mod: CPTII,,, | Performed by: FAMILY MEDICINE

## 2022-10-13 PROCEDURE — 3074F SYST BP LT 130 MM HG: CPT | Mod: CPTII,,, | Performed by: FAMILY MEDICINE

## 2022-10-13 PROCEDURE — 99396 PREV VISIT EST AGE 40-64: CPT | Mod: S$PBB,,, | Performed by: FAMILY MEDICINE

## 2022-10-13 NOTE — PROGRESS NOTES
Subjective:       Patient ID: Jolie Erwin is a 42 y.o. female.    Chief Complaint: Gynecologic Exam    43 yo F, established pt of mine (last 2022), with PMH significant for childhood asthma and newly dx'd MDD/MAK. She presents for annual exam:     BP: 113/72  BMI: Body mass index is 27.58 kg/m².  Diet/Lifestyle: Mostly one meal daily--dinner. Has mostly home-cooked meals; lots of fruits/vegetables. Limits snacking. Physically active with job []; rides bike on occasion  Last Eye Exam: 2021. Has rx'd corrective lenses  Last Dental Exam: Beginning of   LMP: 2022; lasts 1 day. Has h/o endometrial ablation  Last Pap neg 2019  Last Mammogram: BIRADS cat 1 neg 2022  Last Colonoscopy: N/A  Last Flu Vaccine: DUE. Pt declines  Last Tdap Vaccine: DUE.   COVID Vaccine declined    Regarding MDD and MAK, mood has been okay. Pt prefers no treatment at this time. No SI/HI/AVH.      Past Medical History:   Diagnosis Date    Childhood asthma        Patient Active Problem List   Diagnosis    Fracture of left ulna    Moderate episode of recurrent major depressive disorder    Generalized anxiety disorder    Overweight (BMI 25.0-29.9)       Past Surgical History:   Procedure Laterality Date     SECTION          ENDOMETRIAL ABLATION         Family History   Problem Relation Age of Onset    Stroke Maternal Grandmother     Hypertension Maternal Cousin     Hypertension Maternal Cousin        Social History     Tobacco Use   Smoking Status Never   Smokeless Tobacco Never       Social History     Social History Narrative    Tobacco: Intermittent use since 20s; max use 1 pack per week. Max time of no use 2-3 years.     EtOH: 1-2 drinks social gatherings 9-10x per year    Drug: recreational use; marijuana at bedtime 3-4x per week    Employment: Unemployed    Education: HS     Lives with 2 children (20 yo and 21 yo) and 2yo grandson     2017       Medications have been reviewed and  reconciled.     Review of patient's allergies indicates:  No Known Allergies     Review of Systems   Constitutional:  Negative for activity change, appetite change, chills, fever and unexpected weight change.   HENT:  Negative for congestion and sore throat.    Eyes:  Negative for redness, itching and visual disturbance.   Respiratory:  Negative for cough, chest tightness, shortness of breath and wheezing.    Cardiovascular:  Negative for chest pain.   Gastrointestinal:  Negative for abdominal pain, constipation, diarrhea, nausea and vomiting.   Genitourinary:  Negative for dysuria, frequency, hematuria, urgency, vaginal bleeding and vaginal discharge.   Skin:  Negative for rash.   Neurological:  Negative for dizziness, syncope and headaches.   Psychiatric/Behavioral:  Negative for dysphoric mood and suicidal ideas. The patient is not nervous/anxious.        Objective:        /72   Pulse 64   Ht 5' (1.524 m)   Wt 64 kg (141 lb 3.3 oz)   SpO2 98%   BMI 27.58 kg/m²      Physical Exam  Exam conducted with a chaperone present.   Constitutional:       General: She is not in acute distress.     Appearance: She is well-developed.   HENT:      Head: Normocephalic and atraumatic.      Right Ear: External ear normal.      Left Ear: External ear normal.   Eyes:      General: No scleral icterus.     Extraocular Movements: Extraocular movements intact.      Conjunctiva/sclera: Conjunctivae normal.   Cardiovascular:      Rate and Rhythm: Normal rate and regular rhythm.      Heart sounds: Normal heart sounds. No murmur heard.    No friction rub. No gallop.   Pulmonary:      Effort: Pulmonary effort is normal.      Breath sounds: Normal breath sounds. No wheezing or rales.   Genitourinary:     General: Normal vulva.      Cervix: No cervical motion tenderness, discharge, friability, lesion or cervical bleeding.      Uterus: Not enlarged and not tender.       Adnexa:         Right: No mass, tenderness or fullness.           Left: No mass, tenderness or fullness.        Comments: Uterus anteverted  Musculoskeletal:         General: Normal range of motion.      Cervical back: Normal range of motion and neck supple.   Skin:     General: Skin is warm and dry.      Findings: No erythema or rash.   Neurological:      Mental Status: She is alert and oriented to person, place, and time.      Cranial Nerves: No cranial nerve deficit.   Psychiatric:         Mood and Affect: Mood is not anxious or depressed.         Behavior: Behavior normal.       Assessment:       1. Well woman exam    2. Screening for cervical cancer    3. Overweight (BMI 25.0-29.9)    4. Moderate episode of recurrent major depressive disorder    5. Generalized anxiety disorder        Plan:       Jolie was seen today for gynecologic exam.    Diagnoses and all orders for this visit:    Well woman exam  -     Lipid Panel; Future  -     Hemoglobin A1C; Future  -     HIV 1/2 Ag/Ab (4th Gen); Future  -     (In Office Administered) Tdap Vaccine    Screening for cervical cancer  -     Liquid-Based Pap Smear, Screening  -     HPV High Risk Genotypes, PCR    Overweight (BMI 25.0-29.9)    Moderate episode of recurrent major depressive disorder  Comments:  Declines pharmacotherapy and psychotherapy. Recommend routine exercise, meditation, deep breathing, progressive relaxation.    Generalized anxiety disorder  Comments:  Declines pharmacotherapy and psychotherapy. Recommend routine exercise, meditation, deep breathing, progressive relaxation.        Annual exam   BP normotensive  Body mass index is 27.58 kg/m².--see below   Routine eye/dental screenings recommended  Pap/HPV testing today 10/13/2022  Mammogram: BIRADS cat 1 neg 08/2022  Last Colonoscopy: N/A  Flu Vaccine declined  Tdap Vaccine today 10/13/2022   COVID Vaccine declined    Overweight   Body mass index is 27.58 kg/m².  Advised at least 30 min of CV exercise 5 days a week. Encouraged plant-based diet. Advised small/frequent  meals.  Also advised avoidance of sweetened beverages, limit portion sizes, and discussed healthy carbohydrate options (brown rice, 100% whole wheat bread, wheat pasta)        F/U plan pending lab results. If wnl, pt will be seen in 1 year    Previous Visit(s)  ============  Moderate MDD, Moderate MAK   PHQ 9 score 15; MAK-7 score 14 in office 05/20/2022  Holding on pharmacotherapy  Will get patient scheduled with primary care behavioral health program

## 2022-10-15 ENCOUNTER — PATIENT MESSAGE (OUTPATIENT)
Dept: PRIMARY CARE CLINIC | Facility: CLINIC | Age: 42
End: 2022-10-15
Payer: MEDICAID

## 2022-10-15 NOTE — TELEPHONE ENCOUNTER
The 10-year ASCVD risk score (Katiana CH, et al., 2019) is: 0.3%    Values used to calculate the score:      Age: 42 years      Sex: Female      Is Non- : Yes      Diabetic: No      Tobacco smoker: No      Systolic Blood Pressure: 113 mmHg      Is BP treated: No      HDL Cholesterol: 62 mg/dL      Total Cholesterol: 171 mg/dL

## 2022-10-19 LAB
CLINICAL INFO: NORMAL
CYTO CVX: NORMAL
CYTOLOGIST CVX/VAG CYTO: NORMAL
CYTOLOGIST CVX/VAG CYTO: NORMAL
CYTOLOGY CMNT CVX/VAG CYTO-IMP: NORMAL
CYTOLOGY PAP THIN PREP EXPLANATION: NORMAL
DATE OF PREVIOUS PAP: NO
DATE PREVIOUS BX: NO
GEN CATEG CVX/VAG CYTO-IMP: NORMAL
HPV I/H RISK 4 DNA CVX QL NAA+PROBE: NOT DETECTED
LMP START DATE: NORMAL
MICROORGANISM CVX/VAG CYTO: NORMAL
PATHOLOGIST CVX/VAG CYTO: NORMAL
SERVICE CMNT-IMP: NORMAL
SPECIMEN SOURCE CVX/VAG CYTO: NORMAL
STAT OF ADQ CVX/VAG CYTO-IMP: NORMAL

## 2023-07-27 PROBLEM — M54.31 SCIATICA OF RIGHT SIDE: Status: ACTIVE | Noted: 2023-07-27

## 2023-07-27 PROBLEM — J45.20 INTERMITTENT ASTHMA: Status: ACTIVE | Noted: 2023-07-27

## 2023-08-28 ENCOUNTER — OFFICE VISIT (OUTPATIENT)
Dept: OBSTETRICS AND GYNECOLOGY | Facility: CLINIC | Age: 43
End: 2023-08-28
Payer: MEDICAID

## 2023-08-28 VITALS
BODY MASS INDEX: 26.47 KG/M2 | DIASTOLIC BLOOD PRESSURE: 62 MMHG | SYSTOLIC BLOOD PRESSURE: 110 MMHG | WEIGHT: 140.13 LBS

## 2023-08-28 DIAGNOSIS — Z12.31 BREAST CANCER SCREENING BY MAMMOGRAM: ICD-10-CM

## 2023-08-28 DIAGNOSIS — Z76.89 ESTABLISHING CARE WITH NEW DOCTOR, ENCOUNTER FOR: Primary | ICD-10-CM

## 2023-08-28 PROCEDURE — 99203 OFFICE O/P NEW LOW 30 MIN: CPT | Mod: S$PBB,,, | Performed by: OBSTETRICS & GYNECOLOGY

## 2023-08-28 PROCEDURE — 1159F PR MEDICATION LIST DOCUMENTED IN MEDICAL RECORD: ICD-10-PCS | Mod: CPTII,,, | Performed by: OBSTETRICS & GYNECOLOGY

## 2023-08-28 PROCEDURE — 1160F RVW MEDS BY RX/DR IN RCRD: CPT | Mod: CPTII,,, | Performed by: OBSTETRICS & GYNECOLOGY

## 2023-08-28 PROCEDURE — 99999 PR PBB SHADOW E&M-EST. PATIENT-LVL III: ICD-10-PCS | Mod: PBBFAC,,, | Performed by: OBSTETRICS & GYNECOLOGY

## 2023-08-28 PROCEDURE — 99999 PR PBB SHADOW E&M-EST. PATIENT-LVL III: CPT | Mod: PBBFAC,,, | Performed by: OBSTETRICS & GYNECOLOGY

## 2023-08-28 PROCEDURE — 3078F PR MOST RECENT DIASTOLIC BLOOD PRESSURE < 80 MM HG: ICD-10-PCS | Mod: CPTII,,, | Performed by: OBSTETRICS & GYNECOLOGY

## 2023-08-28 PROCEDURE — 3008F BODY MASS INDEX DOCD: CPT | Mod: CPTII,,, | Performed by: OBSTETRICS & GYNECOLOGY

## 2023-08-28 PROCEDURE — 1159F MED LIST DOCD IN RCRD: CPT | Mod: CPTII,,, | Performed by: OBSTETRICS & GYNECOLOGY

## 2023-08-28 PROCEDURE — 3008F PR BODY MASS INDEX (BMI) DOCUMENTED: ICD-10-PCS | Mod: CPTII,,, | Performed by: OBSTETRICS & GYNECOLOGY

## 2023-08-28 PROCEDURE — 3074F PR MOST RECENT SYSTOLIC BLOOD PRESSURE < 130 MM HG: ICD-10-PCS | Mod: CPTII,,, | Performed by: OBSTETRICS & GYNECOLOGY

## 2023-08-28 PROCEDURE — 99213 OFFICE O/P EST LOW 20 MIN: CPT | Mod: PBBFAC | Performed by: OBSTETRICS & GYNECOLOGY

## 2023-08-28 PROCEDURE — 1160F PR REVIEW ALL MEDS BY PRESCRIBER/CLIN PHARMACIST DOCUMENTED: ICD-10-PCS | Mod: CPTII,,, | Performed by: OBSTETRICS & GYNECOLOGY

## 2023-08-28 PROCEDURE — 99203 PR OFFICE/OUTPT VISIT, NEW, LEVL III, 30-44 MIN: ICD-10-PCS | Mod: S$PBB,,, | Performed by: OBSTETRICS & GYNECOLOGY

## 2023-08-28 PROCEDURE — 3074F SYST BP LT 130 MM HG: CPT | Mod: CPTII,,, | Performed by: OBSTETRICS & GYNECOLOGY

## 2023-08-28 PROCEDURE — 3078F DIAST BP <80 MM HG: CPT | Mod: CPTII,,, | Performed by: OBSTETRICS & GYNECOLOGY

## 2023-08-28 NOTE — PROGRESS NOTES
History & Physical  Gynecology      SUBJECTIVE:     Chief Complaint: Annual Exam       History of Present Illness:  Ms. Erwin is a 41 y/o female who presents to clinic to establish care with a new GYN as her GYN recently retired. She is s/p endometrial ablation and BTL for AUB. She reports that her periods are monthly and are no more than spotting. She continues to have bloating and breast tenderness at the time that she would have a normal period.      Review of patient's allergies indicates:  No Known Allergies    Past Medical History:   Diagnosis Date    Childhood asthma      Past Surgical History:   Procedure Laterality Date     SECTION          ENDOMETRIAL ABLATION  2017     OB History          2    Para   2    Term   2            AB        Living             SAB        IAB        Ectopic        Multiple        Live Births                   Family History   Problem Relation Age of Onset    Stroke Maternal Grandmother     Hypertension Maternal Cousin     Hypertension Maternal Cousin      Social History     Tobacco Use    Smoking status: Never    Smokeless tobacco: Never   Substance Use Topics    Alcohol use: No    Drug use: Yes     Types: Marijuana       Current Outpatient Medications   Medication Sig    albuterol (PROAIR HFA) 90 mcg/actuation inhaler Inhale 2 puffs into the lungs every 6 (six) hours as needed for Wheezing. Rescue    neomycin-polymyxin-dexamethasone (MAXITROL) 3.5mg/mL-10,000 unit/mL-0.1 % DrpS Place 1 drop into both eyes every 3 (three) hours.    FLUoxetine 10 MG capsule Take 1 capsule (10 mg total) by mouth once daily.     No current facility-administered medications for this visit.         Review of Systems:  Review of Systems   Constitutional:  Negative for chills and fever.   Eyes:  Negative for visual disturbance.   Respiratory:  Negative for cough and wheezing.    Cardiovascular:  Negative for chest pain and palpitations.   Gastrointestinal:  Positive for  bloating. Negative for abdominal pain, nausea and vomiting.   Genitourinary:  Negative for dysuria, frequency, hematuria, pelvic pain, vaginal bleeding, vaginal discharge and vaginal pain.   Integumentary:  Positive for breast tenderness.   Neurological:  Negative for headaches.   Psychiatric/Behavioral:  Negative for depression.    Breast: Positive for tenderness.       OBJECTIVE:     Physical Exam:  Physical Exam  Vitals and nursing note reviewed.   Constitutional:       Appearance: Normal appearance. She is well-developed.   Cardiovascular:      Rate and Rhythm: Normal rate.   Pulmonary:      Effort: Pulmonary effort is normal. No respiratory distress.   Abdominal:      General: There is no distension.      Palpations: Abdomen is soft.      Tenderness: There is no abdominal tenderness.   Genitourinary:     Exam position: Supine.   Skin:     General: Skin is warm and dry.   Neurological:      Mental Status: She is oriented to person, place, and time.       ASSESSMENT:       ICD-10-CM ICD-9-CM    1. Establishing care with new doctor, encounter for  Z76.89 V65.8 Mammo Digital Screening Bilat      2. Breast cancer screening by mammogram  Z12.31 V76.12 Mammo Digital Screening Bilat         Plan:      Jolie was seen today for annual exam.    Diagnoses and all orders for this visit:    Establishing care with new doctor, encounter for  -     Mammo Digital Screening Bilat; Future  - Pap smear are up to date    Breast cancer screening by mammogram  -     Mammo Digital Screening Bilat; Future        Orders Placed This Encounter   Procedures    Mammo Digital Screening Bilat       Follow up in about 1 year (around 8/28/2024) for Well Woman/Annual.    Counseling time: 15 minutes    Fritz Hernandez

## 2023-09-18 ENCOUNTER — HOSPITAL ENCOUNTER (OUTPATIENT)
Dept: RADIOLOGY | Facility: OTHER | Age: 43
Discharge: HOME | End: 2023-09-18
Attending: OBSTETRICS & GYNECOLOGY
Payer: MEDICAID

## 2023-09-18 DIAGNOSIS — Z76.89 ESTABLISHING CARE WITH NEW DOCTOR, ENCOUNTER FOR: ICD-10-CM

## 2023-09-18 DIAGNOSIS — Z12.31 BREAST CANCER SCREENING BY MAMMOGRAM: ICD-10-CM

## 2023-09-18 PROCEDURE — 77067 SCR MAMMO BI INCL CAD: CPT | Mod: 26,,, | Performed by: RADIOLOGY

## 2023-09-18 PROCEDURE — 77063 MAMMO DIGITAL SCREENING BILAT WITH TOMO: ICD-10-PCS | Mod: 26,,, | Performed by: RADIOLOGY

## 2023-09-18 PROCEDURE — 77063 BREAST TOMOSYNTHESIS BI: CPT | Mod: 26,,, | Performed by: RADIOLOGY

## 2023-09-18 PROCEDURE — 77067 SCR MAMMO BI INCL CAD: CPT | Mod: TC

## 2023-09-18 PROCEDURE — 77067 MAMMO DIGITAL SCREENING BILAT WITH TOMO: ICD-10-PCS | Mod: 26,,, | Performed by: RADIOLOGY

## 2023-09-29 ENCOUNTER — PATIENT MESSAGE (OUTPATIENT)
Dept: PRIMARY CARE CLINIC | Facility: CLINIC | Age: 43
End: 2023-09-29
Payer: MEDICAID

## 2023-09-29 DIAGNOSIS — R06.02 SOB (SHORTNESS OF BREATH): ICD-10-CM

## 2023-09-29 NOTE — TELEPHONE ENCOUNTER
No care due was identified.  [facility] Embedded Care Due Messages. Reference number: [id number].   9/29/2023 4:37:12 PM CDT

## 2023-10-02 RX ORDER — ALBUTEROL SULFATE 90 UG/1
2 AEROSOL, METERED RESPIRATORY (INHALATION) EVERY 6 HOURS PRN
Qty: 18 G | Refills: 3 | OUTPATIENT
Start: 2023-10-02 | End: 2024-10-01

## 2023-11-03 ENCOUNTER — TELEPHONE (OUTPATIENT)
Dept: PRIMARY CARE CLINIC | Facility: CLINIC | Age: 43
End: 2023-11-03
Payer: MEDICAID

## 2023-11-03 DIAGNOSIS — R06.02 SOB (SHORTNESS OF BREATH): ICD-10-CM

## 2023-11-03 RX ORDER — ALBUTEROL SULFATE 90 UG/1
2 AEROSOL, METERED RESPIRATORY (INHALATION) EVERY 6 HOURS PRN
Qty: 18 G | Refills: 3 | Status: CANCELLED | OUTPATIENT
Start: 2023-11-03 | End: 2024-11-02

## 2023-11-03 NOTE — TELEPHONE ENCOUNTER
----- Message from Moises Velasquez sent at 11/3/2023  8:41 AM CDT -----  Regarding: Self [phone]   Type: RX Refill Request    Who Called: Self     Have you contacted your pharmacy: yes     Refill    RX Name and Strength:albuterol (PROAIR HFA) 90 mcg/actuation inhaler    Preferred Pharmacy with phone number: .  [facility] [address] [place] - [address]  [address] [place] [address]  Phone: [phone] Fax: [phone]         Local or Mail Order: local     Would the patient rather a call back or a response via [facility]? Call back     Best Call Back Number: [phone]      Additional Information: Pt stated the smoke is bothering her     Thank you.

## 2023-11-03 NOTE — TELEPHONE ENCOUNTER
----- Message from Moises Velasquez sent at 11/3/2023  8:41 AM CDT -----  Regarding: Self [phone]   Type: RX Refill Request    Who Called: Self     Have you contacted your pharmacy: yes     Refill    RX Name and Strength:albuterol (PROAIR HFA) 90 mcg/actuation inhaler    Preferred Pharmacy with phone number: .  [facility] [address] [place] - [address]  [address] [place] [address]  Phone: [phone] Fax: [phone]         Local or Mail Order: local     Would the patient rather a call back or a response via [facility]? Call back     Best Call Back Number: [phone]      Additional Information: Pt stated the smoke is bothering her     Thank you.

## 2023-11-08 ENCOUNTER — OFFICE VISIT (OUTPATIENT)
Dept: PRIMARY CARE CLINIC | Facility: CLINIC | Age: 43
End: 2023-11-08
Payer: MEDICAID

## 2023-11-08 VITALS
DIASTOLIC BLOOD PRESSURE: 68 MMHG | HEART RATE: 73 BPM | WEIGHT: 138.75 LBS | HEIGHT: 61 IN | SYSTOLIC BLOOD PRESSURE: 108 MMHG | BODY MASS INDEX: 26.2 KG/M2

## 2023-11-08 DIAGNOSIS — J45.31 MILD PERSISTENT ASTHMA WITH ACUTE EXACERBATION: Primary | ICD-10-CM

## 2023-11-08 DIAGNOSIS — F41.1 GAD (GENERALIZED ANXIETY DISORDER): ICD-10-CM

## 2023-11-08 PROCEDURE — 99214 PR OFFICE/OUTPT VISIT, EST, LEVL IV, 30-39 MIN: ICD-10-PCS | Mod: S$PBB,,, | Performed by: STUDENT IN AN ORGANIZED HEALTH CARE EDUCATION/TRAINING PROGRAM

## 2023-11-08 PROCEDURE — 99999PBSHW PR PBB SHADOW TECHNICAL ONLY FILED TO HB: Mod: PBBFAC,,,

## 2023-11-08 PROCEDURE — 99213 OFFICE O/P EST LOW 20 MIN: CPT | Mod: PBBFAC,PN | Performed by: STUDENT IN AN ORGANIZED HEALTH CARE EDUCATION/TRAINING PROGRAM

## 2023-11-08 PROCEDURE — 99214 OFFICE O/P EST MOD 30 MIN: CPT | Mod: S$PBB,,, | Performed by: STUDENT IN AN ORGANIZED HEALTH CARE EDUCATION/TRAINING PROGRAM

## 2023-11-08 PROCEDURE — 1159F PR MEDICATION LIST DOCUMENTED IN MEDICAL RECORD: ICD-10-PCS | Mod: CPTII,,, | Performed by: STUDENT IN AN ORGANIZED HEALTH CARE EDUCATION/TRAINING PROGRAM

## 2023-11-08 PROCEDURE — 99999 PR PBB SHADOW E&M-EST. PATIENT-LVL III: ICD-10-PCS | Mod: PBBFAC,,, | Performed by: STUDENT IN AN ORGANIZED HEALTH CARE EDUCATION/TRAINING PROGRAM

## 2023-11-08 PROCEDURE — 99999PBSHW PR PBB SHADOW TECHNICAL ONLY FILED TO HB: ICD-10-PCS | Mod: PBBFAC,,,

## 2023-11-08 PROCEDURE — 3008F BODY MASS INDEX DOCD: CPT | Mod: CPTII,,, | Performed by: STUDENT IN AN ORGANIZED HEALTH CARE EDUCATION/TRAINING PROGRAM

## 2023-11-08 PROCEDURE — 1159F MED LIST DOCD IN RCRD: CPT | Mod: CPTII,,, | Performed by: STUDENT IN AN ORGANIZED HEALTH CARE EDUCATION/TRAINING PROGRAM

## 2023-11-08 PROCEDURE — 3078F DIAST BP <80 MM HG: CPT | Mod: CPTII,,, | Performed by: STUDENT IN AN ORGANIZED HEALTH CARE EDUCATION/TRAINING PROGRAM

## 2023-11-08 PROCEDURE — 94640 AIRWAY INHALATION TREATMENT: CPT | Mod: PBBFAC,PN

## 2023-11-08 PROCEDURE — 3074F SYST BP LT 130 MM HG: CPT | Mod: CPTII,,, | Performed by: STUDENT IN AN ORGANIZED HEALTH CARE EDUCATION/TRAINING PROGRAM

## 2023-11-08 PROCEDURE — 3008F PR BODY MASS INDEX (BMI) DOCUMENTED: ICD-10-PCS | Mod: CPTII,,, | Performed by: STUDENT IN AN ORGANIZED HEALTH CARE EDUCATION/TRAINING PROGRAM

## 2023-11-08 PROCEDURE — 3078F PR MOST RECENT DIASTOLIC BLOOD PRESSURE < 80 MM HG: ICD-10-PCS | Mod: CPTII,,, | Performed by: STUDENT IN AN ORGANIZED HEALTH CARE EDUCATION/TRAINING PROGRAM

## 2023-11-08 PROCEDURE — 99999 PR PBB SHADOW E&M-EST. PATIENT-LVL III: CPT | Mod: PBBFAC,,, | Performed by: STUDENT IN AN ORGANIZED HEALTH CARE EDUCATION/TRAINING PROGRAM

## 2023-11-08 PROCEDURE — 3074F PR MOST RECENT SYSTOLIC BLOOD PRESSURE < 130 MM HG: ICD-10-PCS | Mod: CPTII,,, | Performed by: STUDENT IN AN ORGANIZED HEALTH CARE EDUCATION/TRAINING PROGRAM

## 2023-11-08 RX ORDER — FLUOXETINE 10 MG/1
1 CAPSULE ORAL DAILY
COMMUNITY
End: 2023-11-08

## 2023-11-08 RX ORDER — IPRATROPIUM BROMIDE AND ALBUTEROL SULFATE 2.5; .5 MG/3ML; MG/3ML
3 SOLUTION RESPIRATORY (INHALATION)
Status: COMPLETED | OUTPATIENT
Start: 2023-11-08 | End: 2023-11-08

## 2023-11-08 RX ORDER — ESCITALOPRAM OXALATE 5 MG/1
10 TABLET ORAL DAILY
Qty: 60 TABLET | Refills: 2 | Status: SHIPPED | OUTPATIENT
Start: 2023-11-08 | End: 2024-03-17

## 2023-11-08 RX ORDER — FLUTICASONE PROPIONATE AND SALMETEROL 250; 50 UG/1; UG/1
1 POWDER RESPIRATORY (INHALATION) 2 TIMES DAILY
COMMUNITY
End: 2023-11-08 | Stop reason: SDUPTHER

## 2023-11-08 RX ORDER — ALBUTEROL SULFATE 90 UG/1
2 AEROSOL, METERED RESPIRATORY (INHALATION) EVERY 6 HOURS PRN
Qty: 18 G | Refills: 3 | Status: SHIPPED | OUTPATIENT
Start: 2023-11-08 | End: 2024-11-07

## 2023-11-08 RX ORDER — MONTELUKAST SODIUM 10 MG/1
1 TABLET ORAL DAILY
COMMUNITY

## 2023-11-08 RX ORDER — FLUTICASONE PROPIONATE AND SALMETEROL 250; 50 UG/1; UG/1
1 POWDER RESPIRATORY (INHALATION) 2 TIMES DAILY
Qty: 60 EACH | Refills: 11 | Status: SHIPPED | OUTPATIENT
Start: 2023-11-08

## 2023-11-08 RX ADMIN — IPRATROPIUM BROMIDE AND ALBUTEROL SULFATE 3 ML: .5; 3 SOLUTION RESPIRATORY (INHALATION) at 10:11

## 2023-11-08 NOTE — PROGRESS NOTES
"11/08/2023    Jolie Erwin  [id number]    Chief Complaint   Patient presents with    [facility]       HPI    This pt is new to me and presents to [facility] care. PMH sig for asthma and MAK. Patient states that she needs a refill of all her asthma medication. Patient would also like something for her "nerves." She was previously prescribed fluoxetine 10 mg, but never started taking it due to fear of side effects. Pt describes having anxiety attacks where she is really nervous and gets sweaty and has to focus on breathing. Endorses sleep disruption due to mind racing and gets <7 hours of sleep. For exercise will play with grandson and martinez further away when she can. Describes diet as "everything that's not right".     Negative 10 point ROS outside of HPI    Social History     Socioeconomic History    Marital status: Single   Tobacco Use    Smoking status: Never    Smokeless tobacco: Never   Substance and Sexual Activity    Alcohol use: No    Drug use: Yes     Types: Marijuana   Social History Narrative    Tobacco: Intermittent use since 20s; max use 1 pack per week. Max time of no use 2-3 years.     EtOH: 1-2 drinks social gatherings 9-10x per year    Drug: recreational use; marijuana at bedtime 3-4x per week    Employment: Unemployed    Education: HS     Lives with 2 children (20 yo and 21 yo) and 2yo grandson     2017     Social Determinants of Health     Financial Resource Strain: Medium Risk (7/21/2022)    Overall Financial Resource Strain (CARDIA)     Difficulty of Paying Living Expenses: Somewhat hard   Food Insecurity: No Food Insecurity (7/21/2022)    Hunger Vital Sign     Worried About Running Out of Food in the Last Year: Never true     Ran Out of Food in the Last Year: Never true   Transportation Needs: No Transportation Needs (7/21/2022)    PRAPARE - Transportation     Lack of Transportation (Medical): No     Lack of Transportation (Non-Medical): No   Physical Activity: Inactive (7/21/2022)    " Exercise Vital Sign     Days of Exercise per Week: 0 days     Minutes of Exercise per Session: 0 min   Stress: No Stress Concern Present (7/21/2022)    [employer] - Occupational Stress Questionnaire     Feeling of Stress : Not at all   Social Connections: Socially Isolated (7/21/2022)    Social Connection and Isolation Panel [NHANES]     Frequency of Communication with Friends and Family: Once a week     Frequency of Social Gatherings with Friends and Family: Twice a week     Attends Muslim Services: Never     Active Member of Clubs or Organizations: No     Attends Club or Organization Meetings: Never     Marital Status: Never    Housing Stability: Low Risk  (7/21/2022)    Housing Stability Vital Sign     Unable to Pay for Housing in the Last Year: No     Number of Places Lived in the Last Year: 1     Unstable Housing in the Last Year: No           Current Outpatient Medications:     albuterol (PROAIR HFA) 90 mcg/actuation inhaler, Inhale 2 puffs into the lungs every 6 (six) hours as needed for Wheezing. Rescue, Disp: 18 g, Rfl: 3    EScitalopram oxalate (LEXAPRO) 5 MG Tab, Take 2 tablets (10 mg total) by mouth once daily., Disp: 60 tablet, Rfl: 2    fluticasone-salmeterol diskus inhaler 250-50 mcg, Inhale 1 puff into the lungs 2 (two) times daily., Disp: 60 each, Rfl: 11    montelukast (SINGULAIR) 10 mg tablet, Take 1 tablet by mouth once daily., Disp: , Rfl:     Current Facility-Administered Medications:     albuterol-ipratropium 2.5 mg-0.5 mg/3 mL nebulizer solution 3 mL, 3 mL, Nebulization, 1 time in Clinic/HOD, Nora Garay MD      Physical Exam  Vitals:    11/08/23 0919   BP: 108/68   Pulse: 73     GEN: NAD, AAox3, well nourished  HEENT: NCAT, EOMI, PEERL, no scleral injection, TM normal, moist mucous membranes, oropharynx clear, no erythema, no exudates  CV: RRR, no m/r/g, trace LE edema  LUNGS: CTAB, non-labored breathing, quiet, decreased breath sounds, faint  expiratory crackles L>R  ABD: soft, non-distended, no rebound/guarding, no organomegaly  EXT: n c/c/e, warm, 5/5 UE and LE strength  NEURO: CNII-XII intact no focal deficit  PSYCH: nl affect, no hallucinations, nl speech  Skin: intact, no rashes/lesions/erythema    1. Mild persistent asthma with acute exacerbation  - REFILLED albuterol (PROAIR HFA) 90 mcg/actuation inhaler; Inhale 2 puffs into the lungs every 6 (six) hours as needed for Wheezing. Rescue  Dispense: 18 g; Refill: 3  - REFILLED fluticasone-salmeterol diskus inhaler 250-50 mcg; Inhale 1 puff into the lungs 2 (two) times daily.  Dispense: 60 each; Refill: 11  - albuterol-ipratropium 2.5 mg-0.5 mg/3 mL nebulizer solution 3 mL   Improved lung exam after treatment    2. MAK (generalized anxiety disorder)  - TSH; Future  -START EScitalopram oxalate (LEXAPRO) 5 MG Tab; Take 2 tablets (10 mg total) by mouth once daily.  Dispense: 60 tablet; Refill: 2   Start with one tab daily and increase to 2 tabs daily at 2 weeks  -discussed mindful meditation, exercise and well balanced diet.         RTC in 6 weeks for med check    Nora Garay MD  Family Medicine

## 2023-12-20 ENCOUNTER — OFFICE VISIT (OUTPATIENT)
Dept: PRIMARY CARE CLINIC | Facility: CLINIC | Age: 43
End: 2023-12-20
Payer: MEDICAID

## 2023-12-20 VITALS
HEART RATE: 58 BPM | BODY MASS INDEX: 26.68 KG/M2 | SYSTOLIC BLOOD PRESSURE: 112 MMHG | WEIGHT: 141.19 LBS | DIASTOLIC BLOOD PRESSURE: 67 MMHG

## 2023-12-20 DIAGNOSIS — M25.511 CHRONIC RIGHT SHOULDER PAIN: Primary | ICD-10-CM

## 2023-12-20 DIAGNOSIS — F41.1 GAD (GENERALIZED ANXIETY DISORDER): ICD-10-CM

## 2023-12-20 DIAGNOSIS — G89.29 CHRONIC RIGHT SHOULDER PAIN: Primary | ICD-10-CM

## 2023-12-20 PROCEDURE — 1159F PR MEDICATION LIST DOCUMENTED IN MEDICAL RECORD: ICD-10-PCS | Mod: CPTII,,, | Performed by: STUDENT IN AN ORGANIZED HEALTH CARE EDUCATION/TRAINING PROGRAM

## 2023-12-20 PROCEDURE — 3008F BODY MASS INDEX DOCD: CPT | Mod: CPTII,,, | Performed by: STUDENT IN AN ORGANIZED HEALTH CARE EDUCATION/TRAINING PROGRAM

## 2023-12-20 PROCEDURE — 99999 PR PBB SHADOW E&M-EST. PATIENT-LVL III: CPT | Mod: PBBFAC,,, | Performed by: STUDENT IN AN ORGANIZED HEALTH CARE EDUCATION/TRAINING PROGRAM

## 2023-12-20 PROCEDURE — 99213 OFFICE O/P EST LOW 20 MIN: CPT | Mod: PBBFAC,PN | Performed by: STUDENT IN AN ORGANIZED HEALTH CARE EDUCATION/TRAINING PROGRAM

## 2023-12-20 PROCEDURE — 99214 OFFICE O/P EST MOD 30 MIN: CPT | Mod: S$PBB,,, | Performed by: STUDENT IN AN ORGANIZED HEALTH CARE EDUCATION/TRAINING PROGRAM

## 2023-12-20 PROCEDURE — 3008F PR BODY MASS INDEX (BMI) DOCUMENTED: ICD-10-PCS | Mod: CPTII,,, | Performed by: STUDENT IN AN ORGANIZED HEALTH CARE EDUCATION/TRAINING PROGRAM

## 2023-12-20 PROCEDURE — 1159F MED LIST DOCD IN RCRD: CPT | Mod: CPTII,,, | Performed by: STUDENT IN AN ORGANIZED HEALTH CARE EDUCATION/TRAINING PROGRAM

## 2023-12-20 PROCEDURE — 3078F PR MOST RECENT DIASTOLIC BLOOD PRESSURE < 80 MM HG: ICD-10-PCS | Mod: CPTII,,, | Performed by: STUDENT IN AN ORGANIZED HEALTH CARE EDUCATION/TRAINING PROGRAM

## 2023-12-20 PROCEDURE — 3074F SYST BP LT 130 MM HG: CPT | Mod: CPTII,,, | Performed by: STUDENT IN AN ORGANIZED HEALTH CARE EDUCATION/TRAINING PROGRAM

## 2023-12-20 PROCEDURE — 3074F PR MOST RECENT SYSTOLIC BLOOD PRESSURE < 130 MM HG: ICD-10-PCS | Mod: CPTII,,, | Performed by: STUDENT IN AN ORGANIZED HEALTH CARE EDUCATION/TRAINING PROGRAM

## 2023-12-20 PROCEDURE — 99999 PR PBB SHADOW E&M-EST. PATIENT-LVL III: ICD-10-PCS | Mod: PBBFAC,,, | Performed by: STUDENT IN AN ORGANIZED HEALTH CARE EDUCATION/TRAINING PROGRAM

## 2023-12-20 PROCEDURE — 3078F DIAST BP <80 MM HG: CPT | Mod: CPTII,,, | Performed by: STUDENT IN AN ORGANIZED HEALTH CARE EDUCATION/TRAINING PROGRAM

## 2023-12-20 PROCEDURE — 99214 PR OFFICE/OUTPT VISIT, EST, LEVL IV, 30-39 MIN: ICD-10-PCS | Mod: S$PBB,,, | Performed by: STUDENT IN AN ORGANIZED HEALTH CARE EDUCATION/TRAINING PROGRAM

## 2023-12-20 RX ORDER — IBUPROFEN 800 MG/1
TABLET ORAL
Qty: 30 TABLET | Refills: 0 | Status: SHIPPED | OUTPATIENT
Start: 2023-12-20

## 2023-12-20 NOTE — PROGRESS NOTES
12/20/2023    Jolie SHEN Rip  [id number]    Chief Complaint   Patient presents with    Follow-up       HPI    This pt is new to me and presents for medication follow up of lexapro for MAK    MAK  Lexapro 5 mg is helping a lot. Tried to increase to 10 mg, but didn't like the way it made her feel.   Walking more  Has been paying more attention to her body and medication side effects    Right shoulder pain  Thought she slept wrong a long time ago, but feels like it's getting worse. Has been present for the last 4 months   Hard to take change clothes  Was taking otc medication    Negative 10 point ROS outside of HPI    Social History     Socioeconomic History    Marital status: Single   Tobacco Use    Smoking status: Never    Smokeless tobacco: Never   Substance and Sexual Activity    Alcohol use: No    Drug use: Yes     Types: Marijuana   Social History Narrative    Tobacco: Intermittent use since 20s; max use 1 pack per week. Max time of no use 2-3 years.     EtOH: 1-2 drinks social gatherings 9-10x per year    Drug: recreational use; marijuana at bedtime 3-4x per week    Employment: Unemployed    Education: HS     Lives with 2 children (20 yo and 23 yo) and 2yo grandson     2017     Social Determinants of Health     Financial Resource Strain: Medium Risk (7/21/2022)    Overall Financial Resource Strain (CARDIA)     Difficulty of Paying Living Expenses: Somewhat hard   Food Insecurity: No Food Insecurity (7/21/2022)    Hunger Vital Sign     Worried About Running Out of Food in the Last Year: Never true     Ran Out of Food in the Last Year: Never true   Transportation Needs: No Transportation Needs (7/21/2022)    PRAPARE - Transportation     Lack of Transportation (Medical): No     Lack of Transportation (Non-Medical): No   Physical Activity: Inactive (7/21/2022)    Exercise Vital Sign     Days of Exercise per Week: 0 days     Minutes of Exercise per Session: 0 min   Stress: No Stress Concern Present  (7/21/2022)    [employer] [place] [employer] - Occupational Stress Questionnaire     Feeling of Stress : Not at all   Social Connections: Socially Isolated (7/21/2022)    Social Connection and Isolation Panel [NHANES]     Frequency of Communication with Friends and Family: Once a week     Frequency of Social Gatherings with Friends and Family: Twice a week     Attends Nondenominational Services: Never     Active Member of Clubs or Organizations: No     Attends Club or Organization Meetings: Never     Marital Status: Never    Housing Stability: Low Risk  (7/21/2022)    Housing Stability Vital Sign     Unable to Pay for Housing in the Last Year: No     Number of Places Lived in the Last Year: 1     Unstable Housing in the Last Year: No           Current Outpatient Medications:     albuterol (PROAIR HFA) 90 mcg/actuation inhaler, Inhale 2 puffs into the lungs every 6 (six) hours as needed for Wheezing. Rescue, Disp: 18 g, Rfl: 3    EScitalopram oxalate (LEXAPRO) 5 MG Tab, Take 2 tablets (10 mg total) by mouth once daily., Disp: 60 tablet, Rfl: 2    fluticasone-salmeterol diskus inhaler 250-50 mcg, Inhale 1 puff into the lungs 2 (two) times daily. (Patient not taking: Reported on 12/20/2023), Disp: 60 each, Rfl: 11    montelukast (SINGULAIR) 10 mg tablet, Take 1 tablet by mouth once daily., Disp: , Rfl:       Physical Exam  Vitals:    12/20/23 0848   BP: 112/67   Pulse: (!) 58       Physical Exam      Gen: well appearing, NAD  Resp: non labored breathing, no crackles, no wheezes, CTAB  CV: RRR no murmur, gallops, rubs, no LE edema  Abd: soft nontender BS present no organomegaly  Shoulder:    Inspection:no obvious deformity or asymmetry   Palpation: bilateral AC joint and bicipital groove tenderness   ROM: slightly decreased active ROM, full passive ROM   Rotator cuff: - drop arm test, + empty can, + Neer +Hawkains R >L    1. Chronic right shoulder pain  - X-ray Shoulder 2 or More Views Right; Future  -  ibuprofen (ADVIL,MOTRIN) 800 MG tablet; Take 1 tab twice daily for 7 days THEN as needed  Dispense: 30 tablet; Refill: 0  -Demonstrated at home exercises    2. MAK (generalized anxiety disorder)  Continue current regimen at 5 mg        RTC as needed for routine care    Nora Garay MD  Family Medicine

## 2024-03-16 DIAGNOSIS — F41.1 GAD (GENERALIZED ANXIETY DISORDER): ICD-10-CM

## 2024-03-16 NOTE — TELEPHONE ENCOUNTER
Care Due:                  Date            Visit Type   Department     Provider  --------------------------------------------------------------------------------                                EP -                              PRIMARY      [facility] PR[facility]  Last Visit: 12-      CARE ([facility])   ANN-MARIE Garay  Next Visit: None Scheduled  None         None Found                                                            Last  Test          Frequency    Reason                     Performed    Due Date  --------------------------------------------------------------------------------    CBC.........  12 months..  ibuprofen................  03- 03-    Cr..........  12 months..  ibuprofen................  03- 03-    [employer] Embedded Care Due Messages. Reference number: [id number].   3/16/2024 2:49:24 PM CDT

## 2024-03-17 RX ORDER — ESCITALOPRAM OXALATE 5 MG/1
10 TABLET ORAL
Qty: 180 TABLET | Refills: 3 | Status: SHIPPED | OUTPATIENT
Start: 2024-03-17

## 2024-03-17 NOTE — TELEPHONE ENCOUNTER
Provider Staff:  Action required for this patient     Please see care gap opportunities below in Care Due Message.    Thanks!  [facility]     Appointments      Date Provider   Last Visit   12/20/2023 Nora Garay MD   Next Visit   Visit date not found Nora Garay MD      Refill Decision Note   Jolie Rip  is requesting a refill authorization.  Brief Assessment and Rationale for Refill:  Approve     Medication Therapy Plan:         Comments:     Note composed:3:40 AM 03/17/2024

## 2024-08-13 ENCOUNTER — OFFICE VISIT (OUTPATIENT)
Dept: OBSTETRICS AND GYNECOLOGY | Facility: CLINIC | Age: 44
End: 2024-08-13
Payer: MEDICAID

## 2024-08-13 VITALS
BODY MASS INDEX: 28.45 KG/M2 | WEIGHT: 150.56 LBS | DIASTOLIC BLOOD PRESSURE: 70 MMHG | SYSTOLIC BLOOD PRESSURE: 116 MMHG

## 2024-08-13 DIAGNOSIS — Z12.31 BREAST CANCER SCREENING BY MAMMOGRAM: ICD-10-CM

## 2024-08-13 DIAGNOSIS — N89.8 VAGINAL DISCHARGE: ICD-10-CM

## 2024-08-13 DIAGNOSIS — Z01.419 WELL WOMAN EXAM WITH ROUTINE GYNECOLOGICAL EXAM: Primary | ICD-10-CM

## 2024-08-13 DIAGNOSIS — Z86.018 HISTORY OF UTERINE FIBROID: ICD-10-CM

## 2024-08-13 PROCEDURE — 99999 PR PBB SHADOW E&M-EST. PATIENT-LVL III: CPT | Mod: PBBFAC,,, | Performed by: OBSTETRICS & GYNECOLOGY

## 2024-08-13 PROCEDURE — 1160F RVW MEDS BY RX/DR IN RCRD: CPT | Mod: CPTII,,, | Performed by: OBSTETRICS & GYNECOLOGY

## 2024-08-13 PROCEDURE — 3078F DIAST BP <80 MM HG: CPT | Mod: CPTII,,, | Performed by: OBSTETRICS & GYNECOLOGY

## 2024-08-13 PROCEDURE — 87591 N.GONORRHOEAE DNA AMP PROB: CPT | Performed by: OBSTETRICS & GYNECOLOGY

## 2024-08-13 PROCEDURE — 3008F BODY MASS INDEX DOCD: CPT | Mod: CPTII,,, | Performed by: OBSTETRICS & GYNECOLOGY

## 2024-08-13 PROCEDURE — 3074F SYST BP LT 130 MM HG: CPT | Mod: CPTII,,, | Performed by: OBSTETRICS & GYNECOLOGY

## 2024-08-13 PROCEDURE — 1159F MED LIST DOCD IN RCRD: CPT | Mod: CPTII,,, | Performed by: OBSTETRICS & GYNECOLOGY

## 2024-08-13 PROCEDURE — 87491 CHLMYD TRACH DNA AMP PROBE: CPT | Performed by: OBSTETRICS & GYNECOLOGY

## 2024-08-13 PROCEDURE — 99213 OFFICE O/P EST LOW 20 MIN: CPT | Mod: PBBFAC | Performed by: OBSTETRICS & GYNECOLOGY

## 2024-08-13 PROCEDURE — 81514 NFCT DS BV&VAGINITIS DNA ALG: CPT | Performed by: OBSTETRICS & GYNECOLOGY

## 2024-08-13 PROCEDURE — 99396 PREV VISIT EST AGE 40-64: CPT | Mod: S$PBB,,, | Performed by: OBSTETRICS & GYNECOLOGY

## 2024-08-13 NOTE — PROGRESS NOTES
History & Physical  Gynecology Problem Visit      SUBJECTIVE:     Chief Complaint: Annual Exam       History of Present Illness:  Annual Exam-Premenopausal  Ms. Erwin is a 42 y/o female who presents for annual exam. She is s/p endometrial ablation and BTL. She has spotting and some months no periods at all. She reports that she does have bloating symptoms. She has been having some breast tenderness over the last 2 weeks. She has also been having more coffee since that time.    She is sexually active. GYN screening history: last pap: approximate date  and was normal and last mammogram: approximate date 2023 and was normal. The patient wears seatbelts: yes. The patient participates in regular exercise: no. Has the patient ever been transfused or tattooed?: no. The patient reports that there is not domestic violence in her life.      Review of patient's allergies indicates:  No Known Allergies    Past Medical History:   Diagnosis Date    Childhood asthma      Past Surgical History:   Procedure Laterality Date     SECTION          ENDOMETRIAL ABLATION  2017     OB History          2    Para   2    Term   2            AB        Living             SAB        IAB        Ectopic        Multiple        Live Births                   Family History   Problem Relation Name Age of Onset    Breast cancer Maternal Aunt      Stroke Maternal Grandmother      Hypertension Maternal Cousin      Hypertension Maternal Cousin       Social History     Tobacco Use    Smoking status: Never    Smokeless tobacco: Never   Substance Use Topics    Alcohol use: No    Drug use: Yes     Types: Marijuana       Current Outpatient Medications   Medication Sig    albuterol (PROAIR HFA) 90 mcg/actuation inhaler Inhale 2 puffs into the lungs every 6 (six) hours as needed for Wheezing. Rescue    EScitalopram oxalate (LEXAPRO) 5 MG Tab Take 2 tablets by mouth once daily    fluticasone-salmeterol diskus inhaler 250-50 mcg  Inhale 1 puff into the lungs 2 (two) times daily. (Patient not taking: Reported on 12/20/2023)    ibuprofen (ADVIL,MOTRIN) 800 MG tablet Take 1 tab twice daily for 7 days THEN as needed    montelukast (SINGULAIR) 10 mg tablet Take 1 tablet by mouth once daily.     No current facility-administered medications for this visit.         Review of Systems:  Review of Systems   Constitutional:  Negative for chills and fever.   HENT:  Negative for congestion.    Eyes:  Negative for visual disturbance.   Respiratory:  Negative for cough and shortness of breath.    Cardiovascular:  Negative for chest pain.   Gastrointestinal:  Negative for abdominal pain, nausea and vomiting.   Genitourinary:  Negative for dysuria, hematuria, vaginal bleeding and vaginal discharge.   Skin:  Negative for rash.   Neurological:  Negative for headaches.   Hematological:  Does not bruise/bleed easily.        OBJECTIVE:   Vitals:     Vitals:    08/13/24 1448   BP: 116/70   Weight: 68.3 kg (150 lb 9.2 oz)        Physical Exam:  Physical Exam  Vitals and nursing note reviewed. Exam conducted with a chaperone present.   Constitutional:       Appearance: She is well-developed.   Cardiovascular:      Rate and Rhythm: Normal rate.   Pulmonary:      Effort: Pulmonary effort is normal. No respiratory distress.   Chest:   Breasts:     Breasts are symmetrical.   Abdominal:      General: There is no distension.      Palpations: Abdomen is soft.      Tenderness: There is no abdominal tenderness.   Genitourinary:     Vagina: Vaginal discharge present.   Skin:     General: Skin is warm and dry.   Neurological:      Mental Status: She is alert and oriented to person, place, and time.         ASSESSMENT:       ICD-10-CM ICD-9-CM    1. Well woman exam with routine gynecological exam  Z01.419 V72.31 Mammo Digital Screening Bilat w/ [facility]      2. Breast cancer screening by mammogram  Z12.31 V76.12 Mammo Digital Screening Bilat w/ [facility]      3. Vaginal discharge  N89.8  623.5 Vaginosis Screen by DNA Probe      C. trachomatis/N. gonorrhoeae by AMP DNA [facility]; Cervicovaginal      4. History of uterine fibroid  Z86.018 V13.29 US Pelvis Comp with Transvag NON-OB (xpd         Plan:    Jolie was seen today for annual exam.    Diagnoses and all orders for this visit:    Well woman exam with routine gynecological exam  -     Mammo Digital Screening Bilat w/ [facility]; Future  - Pap smear normal and UTD    Breast cancer screening by mammogram  -     Mammo Digital Screening Bilat w/ [facility]; Future    Vaginal discharge  -     Vaginosis Screen by DNA Probe  -     C. trachomatis/N. gonorrhoeae by AMP DNA [facility]; Cervicovaginal    History of uterine fibroid  -     US Pelvis Comp with Transvag NON-OB (xpd; Future        Orders Placed This Encounter   Procedures    Mammo Digital Screening Bilat w/ [facility]    US Pelvis Comp with Transvag NON-OB (xpd    Vaginosis Screen by DNA Probe    C. trachomatis/N. gonorrhoeae by AMP DNA [facility]; Cervicovaginal       Follow up in about 1 year (around 8/13/2025) for Well Woman/Annual.

## 2024-08-15 LAB
C TRACH DNA SPEC QL NAA+PROBE: NOT DETECTED
N GONORRHOEA DNA SPEC QL NAA+PROBE: NOT DETECTED

## 2024-08-16 DIAGNOSIS — J45.31 MILD PERSISTENT ASTHMA WITH ACUTE EXACERBATION: ICD-10-CM

## 2024-08-16 LAB
BACTERIAL VAGINOSIS DNA: POSITIVE
CANDIDA GLABRATA DNA: NEGATIVE
CANDIDA KRUSEI DNA: NEGATIVE
CANDIDA RRNA VAG QL PROBE: NEGATIVE
T VAGINALIS RRNA GENITAL QL PROBE: NEGATIVE

## 2024-08-16 NOTE — TELEPHONE ENCOUNTER
Care Due:                  Date            Visit Type   Department     Provider  --------------------------------------------------------------------------------                                EP -                              PRIMARY      [facility] PRIMARY  Last Visit: 12-      CARE ([facility])   ANN-MARIE Garay  Next Visit: None Scheduled  None         None Found                                                            Last  Test          Frequency    Reason                     Performed    Due Date  --------------------------------------------------------------------------------    CBC.........  12 months..  ibuprofen................  Not Found    Overdue    Cr..........  12 months..  ibuprofen................  Not Found    Overdue    [employer] Embedded Care Due Messages. Reference number: [id number].   8/16/2024 9:40:53 AM CDT

## 2024-08-19 DIAGNOSIS — B96.89 BV (BACTERIAL VAGINOSIS): Primary | ICD-10-CM

## 2024-08-19 DIAGNOSIS — N76.0 BV (BACTERIAL VAGINOSIS): Primary | ICD-10-CM

## 2024-08-19 RX ORDER — METRONIDAZOLE 500 MG/1
500 TABLET ORAL EVERY 12 HOURS
Qty: 14 TABLET | Refills: 0 | Status: SHIPPED | OUTPATIENT
Start: 2024-08-19 | End: 2024-08-26

## 2024-08-20 RX ORDER — ALBUTEROL SULFATE 90 UG/1
2 INHALANT RESPIRATORY (INHALATION) EVERY 6 HOURS PRN
Qty: 18 G | Refills: 3 | Status: SHIPPED | OUTPATIENT
Start: 2024-08-20 | End: 2025-08-20

## 2024-08-24 ENCOUNTER — HOSPITAL ENCOUNTER (OUTPATIENT)
Dept: RADIOLOGY | Facility: OTHER | Age: 44
Discharge: HOME | End: 2024-08-24
Attending: OBSTETRICS & GYNECOLOGY
Payer: MEDICAID

## 2024-08-24 DIAGNOSIS — Z86.018 HISTORY OF UTERINE FIBROID: ICD-10-CM

## 2024-08-24 PROCEDURE — 76830 TRANSVAGINAL US NON-OB: CPT | Mod: TC

## 2024-08-24 PROCEDURE — 76856 US EXAM PELVIC COMPLETE: CPT | Mod: 26,,, | Performed by: RADIOLOGY

## 2024-08-24 PROCEDURE — 76830 TRANSVAGINAL US NON-OB: CPT | Mod: 26,,, | Performed by: RADIOLOGY

## 2024-09-21 ENCOUNTER — HOSPITAL ENCOUNTER (OUTPATIENT)
Dept: RADIOLOGY | Facility: OTHER | Age: 44
Discharge: HOME | End: 2024-09-21
Attending: OBSTETRICS & GYNECOLOGY
Payer: MEDICAID

## 2024-09-21 DIAGNOSIS — Z01.419 WELL WOMAN EXAM WITH ROUTINE GYNECOLOGICAL EXAM: ICD-10-CM

## 2024-09-21 DIAGNOSIS — Z12.31 BREAST CANCER SCREENING BY MAMMOGRAM: ICD-10-CM

## 2024-09-21 PROCEDURE — 77063 BREAST TOMOSYNTHESIS BI: CPT | Mod: TC

## 2024-09-21 PROCEDURE — 77067 SCR MAMMO BI INCL CAD: CPT | Mod: 26,,, | Performed by: RADIOLOGY

## 2024-09-21 PROCEDURE — 77067 SCR MAMMO BI INCL CAD: CPT | Mod: TC

## 2024-09-21 PROCEDURE — 77063 BREAST TOMOSYNTHESIS BI: CPT | Mod: 26,,, | Performed by: RADIOLOGY

## 2024-10-30 DIAGNOSIS — G89.29 CHRONIC RIGHT SHOULDER PAIN: ICD-10-CM

## 2024-10-30 DIAGNOSIS — M25.511 CHRONIC RIGHT SHOULDER PAIN: ICD-10-CM

## 2024-10-31 ENCOUNTER — TELEPHONE (OUTPATIENT)
Dept: PRIMARY CARE CLINIC | Facility: CLINIC | Age: 44
End: 2024-10-31
Payer: MEDICAID

## 2024-10-31 RX ORDER — IBUPROFEN 800 MG/1
800 TABLET ORAL EVERY 8 HOURS PRN
Qty: 60 TABLET | Refills: 2 | Status: SHIPPED | OUTPATIENT
Start: 2024-10-31

## 2024-11-26 ENCOUNTER — TELEPHONE (OUTPATIENT)
Dept: PRIMARY CARE CLINIC | Facility: CLINIC | Age: 44
End: 2024-11-26

## 2024-11-26 DIAGNOSIS — J45.31 MILD PERSISTENT ASTHMA WITH ACUTE EXACERBATION: ICD-10-CM

## 2024-11-26 RX ORDER — FLUTICASONE PROPIONATE AND SALMETEROL 250; 50 UG/1; UG/1
POWDER RESPIRATORY (INHALATION) 2 TIMES DAILY
Qty: 60 EACH | Refills: 2 | Status: SHIPPED | OUTPATIENT
Start: 2024-11-26

## 2024-11-26 NOTE — TELEPHONE ENCOUNTER
Refill Routing Note   Medication(s) are not appropriate for processing by [facility] for the following reason(s):        No active prescription written by provider    [facility] action(s):  Defer   Requires appointment : Yes             Appointments  past 12m or future 3m with PCP    Date Provider   Last Visit   12/20/2023 Nora Garay MD   Next Visit   Visit date not found Nora Garay MD   ED visits in past 90 days: 0        Note composed:12:30 PM 11/26/2024

## 2024-11-26 NOTE — TELEPHONE ENCOUNTER
Care Due:                  Date            Visit Type   Department     Provider  --------------------------------------------------------------------------------                                EP -                              PRIMARY      [facility] PRIMARY  Last Visit: 12-      CARE ([facility])   ANN-MARIE Garay  Next Visit: None Scheduled  None         None Found                                                            Last  Test          Frequency    Reason                     Performed    Due Date  --------------------------------------------------------------------------------    Office Visit  12 months..  ibuprofen................  12- 12-    [facility] Embedded Care Due Messages. Reference number: [id number].   11/26/2024 7:02:47 AM CST

## 2024-11-29 DIAGNOSIS — J45.31 MILD PERSISTENT ASTHMA WITH ACUTE EXACERBATION: ICD-10-CM

## 2024-11-29 RX ORDER — ALBUTEROL SULFATE 90 UG/1
2 INHALANT RESPIRATORY (INHALATION) EVERY 6 HOURS PRN
Qty: 18 G | Refills: 3 | OUTPATIENT
Start: 2024-11-29 | End: 2025-11-29

## 2024-11-29 NOTE — TELEPHONE ENCOUNTER
No care due was identified.  [facility] Embedded Care Due Messages. Reference number: [id number].   11/29/2024 12:24:25 PM CST

## 2025-03-02 DIAGNOSIS — J45.31 MILD PERSISTENT ASTHMA WITH ACUTE EXACERBATION: ICD-10-CM

## 2025-03-02 NOTE — TELEPHONE ENCOUNTER
Care Due:                  Date            Visit Type   Department     Provider  --------------------------------------------------------------------------------                                EP -                              PRIMARY      [facility] PRIMARY  Last Visit: 12-      CARE ([facility])   ANN-MARIE Garay                                           [facility] PRIMARY  Next Visit: 04-      [facility]           Nora Garay                                                            Last  Test          Frequency    Reason                     Performed    Due Date  --------------------------------------------------------------------------------    CBC.........  12 months..  ibuprofen................  Not Found    Overdue    Cr..........  12 months..  ibuprofen................  Not Found    Overdue    [employer] Embedded Care Due Messages. Reference number: [id number].   3/02/2025 6:30:44 AM CST

## 2025-03-07 RX ORDER — ALBUTEROL SULFATE 90 UG/1
2 INHALANT RESPIRATORY (INHALATION) EVERY 6 HOURS PRN
Qty: 18 G | Refills: 3 | Status: SHIPPED | OUTPATIENT
Start: 2025-03-07 | End: 2026-03-07

## 2025-03-15 DIAGNOSIS — M25.511 CHRONIC RIGHT SHOULDER PAIN: ICD-10-CM

## 2025-03-15 DIAGNOSIS — G89.29 CHRONIC RIGHT SHOULDER PAIN: ICD-10-CM

## 2025-03-15 RX ORDER — IBUPROFEN 800 MG/1
800 TABLET ORAL EVERY 8 HOURS PRN
Qty: 60 TABLET | Refills: 2 | Status: CANCELLED | OUTPATIENT
Start: 2025-03-15

## 2025-03-16 NOTE — TELEPHONE ENCOUNTER
No care due was identified.  [facility] Embedded Care Due Messages. Reference number: [id number].   3/15/2025 8:39:41 PM CDT

## 2025-05-20 ENCOUNTER — LAB VISIT (OUTPATIENT)
Dept: PRIMARY CARE CLINIC | Facility: CLINIC | Age: 45
End: 2025-05-20
Payer: MEDICAID

## 2025-05-20 ENCOUNTER — OFFICE VISIT (OUTPATIENT)
Dept: PRIMARY CARE CLINIC | Facility: CLINIC | Age: 45
End: 2025-05-20
Payer: MEDICAID

## 2025-05-20 VITALS
DIASTOLIC BLOOD PRESSURE: 80 MMHG | HEART RATE: 69 BPM | OXYGEN SATURATION: 100 % | WEIGHT: 151.25 LBS | TEMPERATURE: 98 F | RESPIRATION RATE: 16 BRPM | BODY MASS INDEX: 28.56 KG/M2 | HEIGHT: 61 IN | SYSTOLIC BLOOD PRESSURE: 134 MMHG

## 2025-05-20 DIAGNOSIS — Z00.00 ROUTINE HEALTH MAINTENANCE: ICD-10-CM

## 2025-05-20 DIAGNOSIS — Z00.00 ROUTINE HEALTH MAINTENANCE: Primary | ICD-10-CM

## 2025-05-20 DIAGNOSIS — R23.2 HOT FLASHES: ICD-10-CM

## 2025-05-20 DIAGNOSIS — G89.29 CHRONIC RIGHT SHOULDER PAIN: ICD-10-CM

## 2025-05-20 DIAGNOSIS — F33.0 MILD EPISODE OF RECURRENT MAJOR DEPRESSIVE DISORDER: ICD-10-CM

## 2025-05-20 DIAGNOSIS — J45.31 MILD PERSISTENT ASTHMA WITH ACUTE EXACERBATION: ICD-10-CM

## 2025-05-20 DIAGNOSIS — M25.511 CHRONIC RIGHT SHOULDER PAIN: ICD-10-CM

## 2025-05-20 LAB
ABSOLUTE EOSINOPHIL (OHS): 0.38 K/UL
ABSOLUTE MONOCYTE (OHS): 0.62 K/UL (ref 0.3–1)
ABSOLUTE NEUTROPHIL COUNT (OHS): 4.96 K/UL (ref 1.8–7.7)
ANION GAP (OHS): 13 MMOL/L (ref 8–16)
BASOPHILS # BLD AUTO: 0.04 K/UL
BASOPHILS NFR BLD AUTO: 0.5 %
BUN SERPL-MCNC: 16 MG/DL (ref 6–20)
CALCIUM SERPL-MCNC: 9.4 MG/DL (ref 8.7–10.5)
CHLORIDE SERPL-SCNC: 106 MMOL/L (ref 95–110)
CHOLEST SERPL-MCNC: 188 MG/DL (ref 120–199)
CHOLEST/HDLC SERPL: 3.7 {RATIO} (ref 2–5)
CO2 SERPL-SCNC: 22 MMOL/L (ref 23–29)
CREAT SERPL-MCNC: 0.7 MG/DL (ref 0.5–1.4)
EAG (OHS): 108 MG/DL (ref 68–131)
ERYTHROCYTE [DISTWIDTH] IN BLOOD BY AUTOMATED COUNT: 14.6 % (ref 11.5–14.5)
ESTRADIOL SERPL HS-MCNC: 55 PG/ML
FSH SERPL-ACNC: 31.24 MIU/ML
GFR SERPLBLD CREATININE-BSD FMLA CKD-EPI: >60 ML/MIN/1.73/M2
GLUCOSE SERPL-MCNC: 88 MG/DL (ref 70–110)
HBA1C MFR BLD: 5.4 % (ref 4–5.6)
HCT VFR BLD AUTO: 36.9 % (ref 37–48.5)
HDLC SERPL-MCNC: 51 MG/DL (ref 40–75)
HDLC SERPL: 27.1 % (ref 20–50)
HGB BLD-MCNC: 11.6 GM/DL (ref 12–16)
IMM GRANULOCYTES # BLD AUTO: 0.02 K/UL (ref 0–0.04)
IMM GRANULOCYTES NFR BLD AUTO: 0.2 % (ref 0–0.5)
LDLC SERPL CALC-MCNC: 117.8 MG/DL (ref 63–159)
LYMPHOCYTES # BLD AUTO: 2.66 K/UL (ref 1–4.8)
MCH RBC QN AUTO: 26.7 PG (ref 27–31)
MCHC RBC AUTO-ENTMCNC: 31.4 G/DL (ref 32–36)
MCV RBC AUTO: 85 FL (ref 82–98)
NONHDLC SERPL-MCNC: 137 MG/DL
NUCLEATED RBC (/100WBC) (OHS): 0 /100 WBC
PLATELET # BLD AUTO: 269 K/UL (ref 150–450)
PMV BLD AUTO: 11.6 FL (ref 9.2–12.9)
POTASSIUM SERPL-SCNC: 3.8 MMOL/L (ref 3.5–5.1)
RBC # BLD AUTO: 4.35 M/UL (ref 4–5.4)
RELATIVE EOSINOPHIL (OHS): 4.4 %
RELATIVE LYMPHOCYTE (OHS): 30.6 % (ref 18–48)
RELATIVE MONOCYTE (OHS): 7.1 % (ref 4–15)
RELATIVE NEUTROPHIL (OHS): 57.2 % (ref 38–73)
SODIUM SERPL-SCNC: 141 MMOL/L (ref 136–145)
TRIGL SERPL-MCNC: 96 MG/DL (ref 30–150)
TSH SERPL-ACNC: 1.16 UIU/ML (ref 0.4–4)
WBC # BLD AUTO: 8.68 K/UL (ref 3.9–12.7)

## 2025-05-20 PROCEDURE — 99213 OFFICE O/P EST LOW 20 MIN: CPT | Mod: PBBFAC,PN | Performed by: STUDENT IN AN ORGANIZED HEALTH CARE EDUCATION/TRAINING PROGRAM

## 2025-05-20 PROCEDURE — 82465 ASSAY BLD/SERUM CHOLESTEROL: CPT

## 2025-05-20 PROCEDURE — 99214 OFFICE O/P EST MOD 30 MIN: CPT | Mod: S$PBB,,, | Performed by: STUDENT IN AN ORGANIZED HEALTH CARE EDUCATION/TRAINING PROGRAM

## 2025-05-20 PROCEDURE — 99999 PR PBB SHADOW E&M-EST. PATIENT-LVL III: CPT | Mod: PBBFAC,,, | Performed by: STUDENT IN AN ORGANIZED HEALTH CARE EDUCATION/TRAINING PROGRAM

## 2025-05-20 PROCEDURE — 3008F BODY MASS INDEX DOCD: CPT | Mod: CPTII,,, | Performed by: STUDENT IN AN ORGANIZED HEALTH CARE EDUCATION/TRAINING PROGRAM

## 2025-05-20 PROCEDURE — 85025 COMPLETE CBC W/AUTO DIFF WBC: CPT

## 2025-05-20 PROCEDURE — 1159F MED LIST DOCD IN RCRD: CPT | Mod: CPTII,,, | Performed by: STUDENT IN AN ORGANIZED HEALTH CARE EDUCATION/TRAINING PROGRAM

## 2025-05-20 PROCEDURE — 82670 ASSAY OF TOTAL ESTRADIOL: CPT

## 2025-05-20 PROCEDURE — 83036 HEMOGLOBIN GLYCOSYLATED A1C: CPT

## 2025-05-20 PROCEDURE — 84443 ASSAY THYROID STIM HORMONE: CPT

## 2025-05-20 PROCEDURE — 3044F HG A1C LEVEL LT 7.0%: CPT | Mod: CPTII,,, | Performed by: STUDENT IN AN ORGANIZED HEALTH CARE EDUCATION/TRAINING PROGRAM

## 2025-05-20 PROCEDURE — 3079F DIAST BP 80-89 MM HG: CPT | Mod: CPTII,,, | Performed by: STUDENT IN AN ORGANIZED HEALTH CARE EDUCATION/TRAINING PROGRAM

## 2025-05-20 PROCEDURE — 83001 ASSAY OF GONADOTROPIN (FSH): CPT

## 2025-05-20 PROCEDURE — 80048 BASIC METABOLIC PNL TOTAL CA: CPT

## 2025-05-20 PROCEDURE — 3075F SYST BP GE 130 - 139MM HG: CPT | Mod: CPTII,,, | Performed by: STUDENT IN AN ORGANIZED HEALTH CARE EDUCATION/TRAINING PROGRAM

## 2025-05-20 RX ORDER — SERTRALINE HYDROCHLORIDE 50 MG/1
50 TABLET, FILM COATED ORAL DAILY
Qty: 30 TABLET | Refills: 2 | Status: SHIPPED | OUTPATIENT
Start: 2025-05-20

## 2025-05-20 RX ORDER — ALBUTEROL SULFATE 90 UG/1
2 INHALANT RESPIRATORY (INHALATION) EVERY 6 HOURS PRN
Qty: 18 G | Refills: 3 | Status: SHIPPED | OUTPATIENT
Start: 2025-05-20 | End: 2026-05-20

## 2025-05-20 RX ORDER — FLUTICASONE PROPIONATE AND SALMETEROL 250; 50 UG/1; UG/1
1 POWDER RESPIRATORY (INHALATION) 2 TIMES DAILY
Qty: 60 EACH | Refills: 11 | Status: SHIPPED | OUTPATIENT
Start: 2025-05-20

## 2025-05-20 RX ORDER — IBUPROFEN 800 MG/1
800 TABLET, FILM COATED ORAL EVERY 8 HOURS PRN
Qty: 60 TABLET | Refills: 2 | Status: SHIPPED | OUTPATIENT
Start: 2025-05-20

## 2025-05-20 NOTE — PROGRESS NOTES
[facility]   Primary Care Visit    DATE   05/20/2025 9:27 AM    REASON FOR VISIT LISTED IN EPIC   Annual Exam and Medication Refill    Last Primary Care Visit: Visit date not found    HISTORY OF PRESENTING ILLNESS   Jolie Erwin, 44 y.o., presents today due to annual exam. She has a past medical history of Childhood asthma.    Today, the patient reports increased anxiety and premenopausal symptoms. Patient described increasing feelings of anxiety that may be attributed to premenopausal including increased irritability. Currently taking Lexapro 10mg but does not feel like it has helped her anxiety. Endorses daily headaches going on for a few months now. No history of migraines. Increased stress and anxiety lately, she thinks this could be a cause for her headaches. She only takes medicine for the pain when it is very severe, doesn't want to double up on pain medication for her headaches and shoulder pain. Her shoulder pain greatly improves with massaging and ibuprofen 800mg.     Endorses premenopausal symptoms including hot flashes, sleep disturbances and mood changes. Describes her sleep as poor and admits to taking 1 benadryl once every other week to help her fall asleep.     Diet is described as fair and consists of rice, vegetables, fruits, protein, chocolate. Has decreased her soda intake. She walks and bikes every day, participates in bike nights every Tuesday.       Current Medications:  Current Outpatient Medications   Medication Sig    albuterol (PROAIR HFA) 90 mcg/actuation inhaler Inhale 2 puffs into the lungs every 6 (six) hours as needed for Wheezing. Rescue    fluticasone-salmeterol diskus inhaler 250-50 mcg Inhale 1 puff into the lungs 2 (two) times daily.    ibuprofen (ADVIL,MOTRIN) 800 MG tablet Take 1 tablet (800 mg total) by mouth every 8 (eight) hours as needed for Pain.    montelukast (SINGULAIR) 10 mg tablet Take 1 tablet by mouth once daily.    sertraline  "(ZOLOFT) 50 MG tablet Take 1 tablet (50 mg total) by mouth once daily.   Last reviewed on 5/20/2025  9:24 AM by Xu Cowart LPN    Allergies:  Review of patient's allergies indicates:  No Known Allergies    SUBJECTIVE   Review of Systems:  Review of Systems   Constitutional:  Positive for diaphoresis. Negative for chills, fatigue and fever.   HENT:  Negative for congestion, sinus pressure, sinus pain and sore throat.    Eyes:  Negative for itching and visual disturbance.   Respiratory:  Negative for shortness of breath.    Cardiovascular:  Positive for palpitations. Negative for chest pain.   Gastrointestinal:  Positive for abdominal distention. Negative for abdominal pain, blood in stool, constipation, diarrhea, nausea and vomiting.   Genitourinary:  Negative for dysuria and hematuria.   Musculoskeletal:  Positive for myalgias. Negative for arthralgias.   Skin:  Negative for rash.   Neurological:  Positive for headaches. Negative for dizziness and light-headedness.   Psychiatric/Behavioral:  Positive for dysphoric mood and sleep disturbance. Negative for suicidal ideas. The patient is nervous/anxious.         OBJECTIVE   Vital Signs:  Vitals:    05/20/25 0919   BP: 134/80   Pulse: 69   Resp: 16   Temp: 97.7 °F (36.5 °C)   SpO2: 100%   Weight: 68.6 kg (151 lb 3.8 oz)   Height: 5' 1" (1.549 m)    Body mass index is 28.58 kg/m².    Previous Weight:  Wt Readings from Last 3 Encounters:   05/20/25 68.6 kg (151 lb 3.8 oz)   08/13/24 68.3 kg (150 lb 9.2 oz)   12/20/23 64 kg (141 lb 3.3 oz)        Physical Exam:  Physical Exam  Constitutional:       Appearance: Normal appearance.   HENT:      Head: Normocephalic and atraumatic.      Right Ear: Tympanic membrane, ear canal and external ear normal. There is no impacted cerumen.      Left Ear: Tympanic membrane, ear canal and external ear normal. There is no impacted cerumen.   Cardiovascular:      Rate and Rhythm: Normal rate and regular rhythm.      Pulses: Normal " pulses.      Heart sounds: Normal heart sounds. No murmur heard.     No gallop.   Pulmonary:      Effort: Pulmonary effort is normal. No respiratory distress.      Breath sounds: Normal breath sounds. No wheezing.   Abdominal:      General: There is no distension.      Palpations: Abdomen is soft.      Tenderness: There is no abdominal tenderness.   Neurological:      General: No focal deficit present.      Mental Status: She is alert and oriented to person, place, and time.   Psychiatric:         Mood and Affect: Mood normal.         Behavior: Behavior normal.       Laboratory Results:  Lab Results   Component Value Date/Time    HGB 13.2 2022 12:07 PM    HCT 42.5 2022 12:07 PM    WBC 10.34 2022 12:07 PM     2022 12:07 PM    GLU 84 2022 12:07 PM    TSH 0.749 2023 10:50 AM    CHOL 171 10/13/2022 09:10 AM    HDL 62 10/13/2022 09:10 AM    TRIG 83 10/13/2022 09:10 AM    ALT 20 2022 12:07 PM    AST 17 2022 12:07 PM    K 3.9 2022 12:07 PM     2022 12:07 PM     2022 12:07 PM    BUN 14 2022 12:07 PM        PAST MEDICAL HISTORY and FAMILY HISTORY   Past medical, surgical, and family history: Reviewed and updated in EMR.     She has a past medical history of Childhood asthma. She has a past surgical history that includes  section and Endometrial ablation (2017). Her family history includes Breast cancer in her maternal aunt; Hypertension in her maternal cousin and maternal cousin; Stroke in her maternal grandmother.    SOCIAL HISTORY   Reviewed and updated social history in EMR.    She reports that she has never smoked. She has never used smokeless tobacco. She reports current drug use. Drug: Marijuana. She reports that she does not drink alcohol.     HEALTH MAINTENANCE PLANNING     Health Maintenance         Date Due Completion Date    COVID-19 Vaccine (2024- season) 2026 (Originally 2024) ---    Influenza  Vaccine (Season Ended) 09/01/2025 ---    Mammogram 09/21/2025 9/21/2024    Hemoglobin A1c (Diabetic Prevention Screening) 10/13/2025 10/13/2022    Cervical Cancer Screening 10/13/2027 10/13/2022    TETANUS VACCINE 10/13/2032 10/13/2022    RSV Vaccine (Age 60+ and Pregnant patients) (1 - 1-dose 75+ series) 10/13/2055 ---            Diabetes Screening:  Hemoglobin A1C   Date Value Ref Range Status   10/13/2022 5.0 4.0 - 5.6 % Final     Comment:     ADA Screening Guidelines:  5.7-6.4%  Consistent with prediabetes  >or=6.5%  Consistent with diabetes    High levels of fetal hemoglobin interfere with the HbA1C  assay. Heterozygous hemoglobin variants (HbS, HgC, etc)do  not significantly interfere with this assay.   However, presence of multiple variants may affect accuracy.          Cardiac Risk Screening:  The 10-year ASCVD risk score (Katiana CH, et al., 2019) is: 0.8%    Values used to calculate the score:      Age: 44 years      Sex: Female      Is Non- : Yes      Diabetic: No      Tobacco smoker: No      Systolic Blood Pressure: 134 mmHg      Is BP treated: No      HDL Cholesterol: 62 mg/dL      Total Cholesterol: 171 mg/dL    ASSESSMENT and PLAN     Jolie HERMELINDA Erwin should follow up in 6 weeks, or at least annually for a wellness visit.  Routine health maintenance  -     CBC Auto Differential; Future; Expected date: 05/20/2025  -     Basic Metabolic Panel; Future; Expected date: 05/20/2025  -     TSH; Future; Expected date: 05/20/2025  -     Hemoglobin A1C; Future; Expected date: 05/20/2025  -     Lipid Panel; Future; Expected date: 05/20/2025    Mild persistent asthma with acute exacerbation  -     albuterol (PROAIR HFA) 90 mcg/actuation inhaler; Inhale 2 puffs into the lungs every 6 (six) hours as needed for Wheezing. Rescue  Dispense: 18 g; Refill: 3  -     fluticasone-salmeterol diskus inhaler 250-50 mcg; Inhale 1 puff into the lungs 2 (two) times daily.  Dispense: 60 each; Refill:  11    Chronic right shoulder pain  -     ibuprofen (ADVIL,MOTRIN) 800 MG tablet; Take 1 tablet (800 mg total) by mouth every 8 (eight) hours as needed for Pain.  Dispense: 60 tablet; Refill: 2    Hot flashes  -     Follicle Stimulating Hormone; Future; Expected date: 05/20/2025  -     ESTRADIOL; Future; Expected date: 05/20/2025    Mild episode of recurrent major depressive disorder  -     sertraline (ZOLOFT) 50 MG tablet; Take 1 tablet (50 mg total) by mouth once daily.  Dispense: 30 tablet; Refill: 2  - Stop Lexapro switch to Zoloft, follow up in 6 weeks    Labs today (CBC, BMP, TSH, Hemoglobin A1C, Lipid Panel, FSH, Estradiol). Switch lexapro to Sertraline, RTC in 6 weeks for medication check.        Health Maintenance Discussed  - Patient advised of health maintenance recommendations for age, sex, and personal/social risk factors.   - Patient advised of benefits of receiving a wellness exam with health maintenance lab work and recommended vaccines annually.    Recommended Immunizations Discussed   - Patient advised of the benefits and risks of receiving recommended immunizations for health maintenance.    - Patient advised of the most common side effects of immunizations, including injection site soreness, injection site redness, injection site pain, and tiredness/fatigue for about 48-72 hours.   - Patient advised that severe side effects are very rare, including severe allergic reactions, wheezing, difficulties in breathing or shortness of breath.   - For in clinic administration of vaccine, patient advised that it is recommended that the patient stays in the waiting room for observation for at least 15 minutes to monitor for any reaction(s) to the vaccine.    Patient was counseled today on:  - Diet:  Patient has been advised to follow a diet emphasizing vegetables, fruits, whole grains, low-fat dairy products, fish, legumes and nuts. Patient has been advised to limit sodium, sweets and red meat. Benefit of  15-20 minute post-prandial walk/stroll was discussed.  - Exercise: Patient has been advised that regular physical activity positively impacts health. Patients who are able should engage in mild-to-moderate-intensity aerobic for a minimum of 20-30 minutes 4-5 times a week.    - Smoking and Alcohol:  Harmful effects of smoking, etOH, and recreational drugs discussed. Patient has been advised that counseling with or without drug therapy is available to help quit smoking.  - Sleep Apnea: Patient has been advised that a referral for BATSHEVA testing is available, and that treatment with positive airway pressure may be beneficial if she is found to have BATSHEVA.    The above asssessment and plan was discussed with Dr. Garay.    --  Idalmis Gil   Medical Student    I hereby acknowledge that I am relying upon documentation authored by a medical student working under my supervision and further I hereby attest that I have verified the student documentation or findings by personally performing the physical exam and medical decision making activities of the Evaluation and Management service to be billed.    Nora Garay MD

## 2025-05-21 ENCOUNTER — RESULTS FOLLOW-UP (OUTPATIENT)
Dept: PRIMARY CARE CLINIC | Facility: CLINIC | Age: 45
End: 2025-05-21

## 2025-05-22 RX ORDER — FERROUS SULFATE 324(65)MG
TABLET, DELAYED RELEASE (ENTERIC COATED) ORAL
Qty: 30 TABLET | Refills: 3 | Status: SHIPPED | OUTPATIENT
Start: 2025-05-22

## 2025-07-01 ENCOUNTER — OFFICE VISIT (OUTPATIENT)
Dept: PRIMARY CARE CLINIC | Facility: CLINIC | Age: 45
End: 2025-07-01
Payer: MEDICAID

## 2025-07-01 DIAGNOSIS — F41.1 GAD (GENERALIZED ANXIETY DISORDER): Primary | ICD-10-CM

## 2025-07-01 PROCEDURE — 1160F RVW MEDS BY RX/DR IN RCRD: CPT | Mod: CPTII,95,, | Performed by: STUDENT IN AN ORGANIZED HEALTH CARE EDUCATION/TRAINING PROGRAM

## 2025-07-01 PROCEDURE — 3044F HG A1C LEVEL LT 7.0%: CPT | Mod: CPTII,95,, | Performed by: STUDENT IN AN ORGANIZED HEALTH CARE EDUCATION/TRAINING PROGRAM

## 2025-07-01 PROCEDURE — 98004 SYNCH AUDIO-VIDEO EST SF 10: CPT | Mod: 95,,, | Performed by: STUDENT IN AN ORGANIZED HEALTH CARE EDUCATION/TRAINING PROGRAM

## 2025-07-01 PROCEDURE — 1159F MED LIST DOCD IN RCRD: CPT | Mod: CPTII,95,, | Performed by: STUDENT IN AN ORGANIZED HEALTH CARE EDUCATION/TRAINING PROGRAM

## 2025-07-01 RX ORDER — KETOCONAZOLE 20 MG/ML
SHAMPOO, SUSPENSION TOPICAL
COMMUNITY
Start: 2025-06-29

## 2025-07-01 RX ORDER — KETOCONAZOLE 20 MG/G
CREAM TOPICAL
COMMUNITY
Start: 2025-06-30

## 2025-07-01 NOTE — PROGRESS NOTES
The patient location is: [place]  The chief complaint leading to consultation is: medication follow up    Visit type: audiovisual    Face to Face time with patient: 8 minutes   10 minutes of total time spent on the encounter, which includes face to face time and non-face to face time preparing to see the patient (eg, review of tests), Obtaining and/or reviewing separately obtained history, Documenting clinical information in the electronic or other health record, Independently interpreting results (not separately reported) and communicating results to the patient/family/caregiver, or Care coordination (not separately reported).         Each patient to whom he or she provides medical services by telemedicine is:  (1) informed of the relationship between the physician and patient and the respective role of any other health care provider with respect to management of the patient; and (2) notified that he or she may decline to receive medical services by telemedicine and may withdraw from such care at any time.    Notes:     This pt is following up virtually for medication check. Last visit was switched from lexapro to sertraline. Sertraline is working. The first night she felt weird after taking a whole tab and switched to 1/2 tab for a few days before escalating to a whole tab in the morning. Notes that she is sleeping better and has less anxiety. Notes that she has less anxiety. Overall, is feeling a lot better. No other concerns today.    1. MAK (generalized anxiety disorder)  Improved with sertraline. Continue daily.     RTC 6 months                           Answers submitted by the patient for this visit:  Review of Systems Questionnaire (Submitted on 7/1/2025)  activity change: No  unexpected weight change: No  neck pain: No  hearing loss: No  rhinorrhea: No  trouble swallowing: No  eye discharge: No  visual disturbance: No  chest tightness: No  wheezing: No  chest pain: No  palpitations: No  blood in stool:  No  constipation: No  vomiting: No  diarrhea: No  polydipsia: No  polyuria: No  difficulty urinating: No  hematuria: No  menstrual problem: No  dysuria: No  joint swelling: No  arthralgias: No  headaches: No  weakness: No  confusion: No  dysphoric mood: No

## 2025-07-31 ENCOUNTER — PATIENT MESSAGE (OUTPATIENT)
Dept: PRIMARY CARE CLINIC | Facility: CLINIC | Age: 45
End: 2025-07-31
Payer: MEDICAID

## 2025-08-15 ENCOUNTER — OFFICE VISIT (OUTPATIENT)
Dept: PRIMARY CARE CLINIC | Facility: CLINIC | Age: 45
End: 2025-08-15
Payer: MEDICAID

## 2025-08-15 VITALS
OXYGEN SATURATION: 97 % | HEART RATE: 66 BPM | DIASTOLIC BLOOD PRESSURE: 71 MMHG | RESPIRATION RATE: 18 BRPM | SYSTOLIC BLOOD PRESSURE: 104 MMHG | WEIGHT: 150.38 LBS | BODY MASS INDEX: 28.39 KG/M2 | TEMPERATURE: 98 F | HEIGHT: 61 IN

## 2025-08-15 DIAGNOSIS — R22.1 NECK SWELLING: Primary | ICD-10-CM

## 2025-08-15 PROCEDURE — 99214 OFFICE O/P EST MOD 30 MIN: CPT | Mod: S$PBB,,, | Performed by: STUDENT IN AN ORGANIZED HEALTH CARE EDUCATION/TRAINING PROGRAM

## 2025-08-15 PROCEDURE — 99213 OFFICE O/P EST LOW 20 MIN: CPT | Mod: PBBFAC,PN | Performed by: STUDENT IN AN ORGANIZED HEALTH CARE EDUCATION/TRAINING PROGRAM

## 2025-08-15 PROCEDURE — 3008F BODY MASS INDEX DOCD: CPT | Mod: CPTII,,, | Performed by: STUDENT IN AN ORGANIZED HEALTH CARE EDUCATION/TRAINING PROGRAM

## 2025-08-15 PROCEDURE — 99999 PR PBB SHADOW E&M-EST. PATIENT-LVL III: CPT | Mod: PBBFAC,,, | Performed by: STUDENT IN AN ORGANIZED HEALTH CARE EDUCATION/TRAINING PROGRAM

## 2025-08-15 PROCEDURE — 1159F MED LIST DOCD IN RCRD: CPT | Mod: CPTII,,, | Performed by: STUDENT IN AN ORGANIZED HEALTH CARE EDUCATION/TRAINING PROGRAM

## 2025-08-15 PROCEDURE — 3074F SYST BP LT 130 MM HG: CPT | Mod: CPTII,,, | Performed by: STUDENT IN AN ORGANIZED HEALTH CARE EDUCATION/TRAINING PROGRAM

## 2025-08-15 PROCEDURE — 3044F HG A1C LEVEL LT 7.0%: CPT | Mod: CPTII,,, | Performed by: STUDENT IN AN ORGANIZED HEALTH CARE EDUCATION/TRAINING PROGRAM

## 2025-08-15 PROCEDURE — 3078F DIAST BP <80 MM HG: CPT | Mod: CPTII,,, | Performed by: STUDENT IN AN ORGANIZED HEALTH CARE EDUCATION/TRAINING PROGRAM

## 2025-08-23 ENCOUNTER — HOSPITAL ENCOUNTER (OUTPATIENT)
Dept: RADIOLOGY | Facility: OTHER | Age: 45
Discharge: HOME | End: 2025-08-23
Attending: STUDENT IN AN ORGANIZED HEALTH CARE EDUCATION/TRAINING PROGRAM
Payer: MEDICAID

## 2025-08-23 DIAGNOSIS — R22.1 NECK SWELLING: ICD-10-CM

## 2025-08-23 PROCEDURE — 76536 US EXAM OF HEAD AND NECK: CPT | Mod: 26,,, | Performed by: RADIOLOGY

## 2025-08-23 PROCEDURE — 76536 US EXAM OF HEAD AND NECK: CPT | Mod: TC
